# Patient Record
Sex: FEMALE | Race: BLACK OR AFRICAN AMERICAN | NOT HISPANIC OR LATINO | Employment: FULL TIME | URBAN - METROPOLITAN AREA
[De-identification: names, ages, dates, MRNs, and addresses within clinical notes are randomized per-mention and may not be internally consistent; named-entity substitution may affect disease eponyms.]

---

## 2017-07-09 PROCEDURE — 99284 EMERGENCY DEPT VISIT MOD MDM: CPT | Mod: EDC

## 2017-07-10 ENCOUNTER — HOSPITAL ENCOUNTER (EMERGENCY)
Facility: MEDICAL CENTER | Age: 22
End: 2017-07-10
Attending: EMERGENCY MEDICINE
Payer: MEDICAID

## 2017-07-10 ENCOUNTER — PATIENT OUTREACH (OUTPATIENT)
Dept: HEALTH INFORMATION MANAGEMENT | Facility: OTHER | Age: 22
End: 2017-07-10

## 2017-07-10 VITALS
RESPIRATION RATE: 18 BRPM | SYSTOLIC BLOOD PRESSURE: 127 MMHG | DIASTOLIC BLOOD PRESSURE: 85 MMHG | TEMPERATURE: 98.5 F | HEIGHT: 67 IN | HEART RATE: 60 BPM | OXYGEN SATURATION: 98 %

## 2017-07-10 DIAGNOSIS — K04.7 PERIAPICAL ABSCESS: ICD-10-CM

## 2017-07-10 PROCEDURE — 700101 HCHG RX REV CODE 250: Mod: EDC | Performed by: EMERGENCY MEDICINE

## 2017-07-10 PROCEDURE — A9270 NON-COVERED ITEM OR SERVICE: HCPCS | Mod: EDC | Performed by: EMERGENCY MEDICINE

## 2017-07-10 PROCEDURE — 700102 HCHG RX REV CODE 250 W/ 637 OVERRIDE(OP): Mod: EDC | Performed by: EMERGENCY MEDICINE

## 2017-07-10 RX ORDER — ACETAMINOPHEN 325 MG/1
1000 TABLET ORAL ONCE
Status: COMPLETED | OUTPATIENT
Start: 2017-07-10 | End: 2017-07-10

## 2017-07-10 RX ORDER — IBUPROFEN 600 MG/1
600 TABLET ORAL ONCE
Status: COMPLETED | OUTPATIENT
Start: 2017-07-10 | End: 2017-07-10

## 2017-07-10 RX ORDER — BUPIVACAINE HYDROCHLORIDE 5 MG/ML
30 INJECTION, SOLUTION EPIDURAL; INTRACAUDAL ONCE
Status: COMPLETED | OUTPATIENT
Start: 2017-07-10 | End: 2017-07-10

## 2017-07-10 RX ORDER — IBUPROFEN 200 MG
200 TABLET ORAL EVERY 6 HOURS PRN
COMMUNITY
End: 2019-04-18

## 2017-07-10 RX ORDER — AMOXICILLIN 875 MG/1
875 TABLET, COATED ORAL 2 TIMES DAILY
Qty: 20 TAB | Refills: 0 | Status: SHIPPED | OUTPATIENT
Start: 2017-07-10 | End: 2017-07-20

## 2017-07-10 RX ORDER — ACETAMINOPHEN 325 MG/1
650 TABLET ORAL EVERY 4 HOURS PRN
COMMUNITY
End: 2020-02-29

## 2017-07-10 RX ORDER — AMOXICILLIN 500 MG/1
500 CAPSULE ORAL ONCE
Status: COMPLETED | OUTPATIENT
Start: 2017-07-10 | End: 2017-07-10

## 2017-07-10 RX ADMIN — AMOXICILLIN 500 MG: 500 CAPSULE ORAL at 01:52

## 2017-07-10 RX ADMIN — IBUPROFEN 600 MG: 600 TABLET, FILM COATED ORAL at 01:52

## 2017-07-10 RX ADMIN — BUPIVACAINE HYDROCHLORIDE 30 ML: 5 INJECTION, SOLUTION EPIDURAL; INTRACAUDAL; PERINEURAL at 01:00

## 2017-07-10 RX ADMIN — ACETAMINOPHEN 975 MG: 325 TABLET, FILM COATED ORAL at 01:52

## 2017-07-10 NOTE — ED NOTES
"Jose Antonio Wiggins D/C'd.  Discharge instructions including s/s to return to ED, follow up appointments, hydration importance and pain managment  provided to pt.    Pt verbalized understanding with no further questions and concerns.    Copy of discharge provided to pt.  Signed copy in chart.    Prescription for amoxil e-prescript provided to pt.   Pt ambulates out of department; pt in NAD, awake, alert, interactive and age appropriate.  VS /85 mmHg  Pulse 60  Temp(Src) 36.9 °C (98.5 °F)  Resp 18  Ht 1.702 m (5' 7\")  SpO2 98%    "

## 2017-07-10 NOTE — DISCHARGE INSTRUCTIONS
Dental Abscess  A dental abscess is pus in or around a tooth.  HOME CARE  · Take medicines only as told by your dentist.  · If you were prescribed antibiotic medicine, finish all of it even if you start to feel better.  · Rinse your mouth (gargle) often with salt water.  · Do not drive or use heavy machinery, like a , while taking pain medicine.  · Do not apply heat to the outside of your mouth.  · Keep all follow-up visits as told by your dentist. This is important.  GET HELP IF:  · Your pain is worse, and medicine does not help.  GET HELP RIGHT AWAY IF:  · You have a fever or chills.  · Your symptoms suddenly get worse.  · You have a very bad headache.  · You have problems breathing or swallowing.  · You have trouble opening your mouth.  · You have puffiness (swelling) in your neck or around your eye.     This information is not intended to replace advice given to you by your health care provider. Make sure you discuss any questions you have with your health care provider.     Document Released: 05/03/2016 Document Reviewed: 12/15/2015  [employer] Interactive Patient Education ©2016 [employer]

## 2017-07-10 NOTE — ED AVS SNAPSHOT
7/10/2017    Jose Antonio Tj Wiggins  [address]  [place] [address]    Dear [facility]:    [facility] wants to ensure your discharge home is safe and you or your loved ones have had all of your questions answered regarding your care after you leave the hospital.    Below is a list of resources and contact information should you have any questions regarding your hospital stay, follow-up instructions, or active medical symptoms.    Questions or Concerns Regarding… Contact   Medical Questions Related to Your Discharge  (7 days a week, 8am-5pm) Contact a Nurse Care Coordinator   [phone]   Medical Questions Not Related to Your Discharge  (24 hours a day / 7 days a week)  Contact the Nurse Health Line   [phone]    Medications or Discharge Instructions Refer to your discharge packet   or contact your [facility] Primary Care Provider   [phone]   Follow-up Appointment(s) Schedule your appointment via TripConnect   or contact Scheduling [phone]   Billing Review your statement via TripConnect  or contact Billing [phone]   Medical Records Review your records via TripConnect   or contact Medical Records [phone]     You may receive a telephone call within two days of discharge. This call is to make certain you understand your discharge instructions and have the opportunity to have any questions answered. You can also easily access your medical information, test results and upcoming appointments via the TripConnect free online health management tool. You can learn more and sign up at [email or web address]. For assistance setting up your TripConnect account, please call [phone].    Once again, we want to ensure your discharge home is safe and that you have a clear understanding of any next steps in your care. If you have any questions or concerns, please do not hesitate to contact us, we are here for you. Thank you for choosing [facility] for your healthcare needs.    Sincerely,    Your [facility] Healthcare Team

## 2017-07-10 NOTE — ED NOTES
.  Chief Complaint   Patient presents with   • Dental Pain     pt with co pain to rt lower tooth for few days now has decay noted to tooth, states that pain radiating to rt ear now     State took tylenol and ibuprofen both today without any relief.Pt Informed regarding triage process and verbalized understanding to inform triage tech or RN for any changes in condition.  Placed in lobby.

## 2017-07-10 NOTE — ED AVS SNAPSHOT
RatePoint Access Code: [email or web address]  Expires: 8/9/2017  1:39 AM    RatePoint  A secure, online tool to manage your health information     [employer]’s RatePoint® is a secure, online tool that connects you to your personalized health information from the privacy of your home -- day or night - making it very easy for you to manage your healthcare. Once the activation process is completed, you can even access your medical information using the RatePoint shabana, which is available for free in the Apple Shabana store or Google Play store.     RatePoint provides the following levels of access (as shown below):   My Chart Features   [facility] Primary Care Doctor [facility]  Specialists [facility]  Urgent  Care Non-[facility]  Primary Care  Doctor   Email your healthcare team securely and privately 24/7 X X X X   Manage appointments: schedule your next appointment; view details of past/upcoming appointments X      Request prescription refills. X      View recent personal medical records, including lab and immunizations X X X X   View health record, including health history, allergies, medications X X X X   Read reports about your outpatient visits, procedures, consult and ER notes X X X X   See your discharge summary, which is a recap of your hospital and/or ER visit that includes your diagnosis, lab results, and care plan. X X       How to register for RatePoint:  1. Go to  [email or web address].  2. Click on the Sign Up Now box, which takes you to the New Member Sign Up page. You will need to provide the following information:  a. Enter your RatePoint Access Code exactly as it appears at the top of this page. (You will not need to use this code after you’ve completed the sign-up process. If you do not sign up before the expiration date, you must request a new code.)   b. Enter your date of birth.   c. Enter your home email address.   d. Click Submit, and follow the next screen’s instructions.  3. Create a RatePoint ID. This will be your RatePoint  login ID and cannot be changed, so think of one that is secure and easy to remember.  4. Create a GRAVIDI password. You can change your password at any time.  5. Enter your Password Reset Question and Answer. This can be used at a later time if you forget your password.   6. Enter your e-mail address. This allows you to receive e-mail notifications when new information is available in GRAVIDI.  7. Click Sign Up. You can now view your health information.    For assistance activating your GRAVIDI account, call [phone]

## 2017-07-10 NOTE — ED NOTES
Pt reports lower right sided dental pain that comes and goes, pt reports placing a white filling in the hole, no obvious swelling noted, pt denies fevers. Pt reports taking motrin and tylenol earlier today.

## 2017-07-10 NOTE — ED PROVIDER NOTES
"ED Provider Note    CHIEF COMPLAINT  Chief Complaint   Patient presents with   • Dental Pain     pt with co pain to rt lower tooth for few days now has decay noted to tooth, states that pain radiating to rt ear now       HPI  Jose Antonio Wiggins is a 22 y.o. female who presents here for evaluation of dental pain. The patient states that she has had dental problem with this tooth for a couple of months. The patient states that over the past 3 days it has become more painful, swollen, and radiating up the side of her face. The patient denies associated fevers, but does endorse sensitivities with hot and cold liquids. The patient states that she was having difficulty tolerating this, and due to that, decided to present here for further evaluation.    REVIEW OF SYSTEMS  See HPI for further details. All other systems are negative.     PAST MEDICAL HISTORY   has a past medical history of Aortic septal defect (Diagnosed 3/2007); Heart murmur; H/O heart surgery \"ASD\" Has implant card in media 2008 (6/20/2012); Proteinuria 2+ with voided specimen with first visit (6/20/2012); and Anemia (10/16/2012).    SOCIAL HISTORY  Social History     Social History Main Topics   • Smoking status: Current Some Day Smoker -- 0.25 packs/day for 2 years     Types: Cigarettes     Last Attempt to Quit: 10/01/2013   • Smokeless tobacco: Never Used      Comment: 1/2 pack a day. Pt. quit when she found out she was pregnanct.    • Alcohol Use: Yes      Comment: occassional   • Drug Use: No   • Sexual Activity:     Partners: Male      Comment: none       SURGICAL HISTORY   has past surgical history that includes other cardiac surgery.    CURRENT MEDICATIONS  Home Medications     **Home medications have not yet been reviewed for this encounter**          ALLERGIES  Allergies   Allergen Reactions   • Norco [Hydrocodone-Acetaminophen] Rash       PHYSICAL EXAM  VITAL SIGNS: /71 mmHg  Pulse 66  Temp(Src) 37.2 °C (98.9 °F)  Resp 14  Ht 1.702 " "m (5' 7\")  SpO2 98%   Pulse ox interpretation: I interpret this pulse ox as normal.  Constitutional: Alert in no apparent distress.  HENT: Normocephalic, Atraumatic, Bilateral external ears normal. Nose normal, tooth #1 with a small amount of dentin placed over the obvious area of cavity.   Eyes: Pupils are equal and reactive. Conjunctiva normal, non-icteric.   Heart: Regular rate and rythm.  Lungs: No audible wheezing, no increased work of breathing, no accessory muscle use.  Abdomen: Soft, non-distended, non-tender   Skin: Warm, Dry, No erythema, No rash.   Neurologic: Alert, Grossly non-focal.   Psychiatric: Affect normal, Judgment normal, Mood normal, appears alert and not intoxicated.           COURSE & MEDICAL DECISION MAKING  Pertinent Labs & Imaging studies reviewed. (See chart for details)    Patient presented here for evaluation of dental pain. Here on examination the patient has what appears to be a cavity at tooth #1. Given this, the patient likely has a periapical abscess, and though inferior alveolar  nerve block was considered for this patient, the patient did not feel that she needed it. Given this, the patient was given ibuprofen and Tylenol here, and was discharged with a prescription for amoxicillin and treatment for her dental infection. The patient was instructed to return here should she develop any new or concerning symptoms.    The patient will not drink alcohol nor drive with prescribed medications. The patient will return for worsening symptoms and is stable at the time of discharge. The patient verbalizes understanding and will comply.    The patient is referred to a primary physician for blood pressure management, diabetic screening, and for all other preventative health concerns, should they be present.    FINAL IMPRESSION  1. Tooth #1 periapical abscess  2.   3.         Electronically signed by: Tyson Schumacher, 7/10/2017 12:39 AM      This record was made with a voice recognition " software. I have tried to correct any grammar, spelling or context errors to the best of my ability, but errors may still remain. Interpretation of this chart should be taken in this context.

## 2017-07-10 NOTE — ED AVS SNAPSHOT
Home Care Instructions                                                                                                                Jose Antonio Wiggins   MRN: [id number]    Department:  [facility], Emergency Dept   Date of Visit:  7/9/2017            [facility], Emergency Dept    [address] [address]    Phone:  [phone]      You were seen by     Tyson Schumacher M.D.      Your Diagnosis Was     Periapical abscess     K04.7       These are the medications you received during your hospitalization from 07/09/2017 2209 to 07/10/2017 0139     Date/Time Order Dose Route Action    07/10/2017 0100 bupivacaine (pf) (MARCAINE/SENSORCAINE) 0.5 % injection 30 mL 30 mL Injection Given      Follow-up Information     1. Schedule an appointment as soon as possible for a visit with [facility].    Contact information    [address] [place] [address] [phone]      Medication Information     Review all of your home medications and newly ordered medications with your primary doctor and/or pharmacist as soon as possible. Follow medication instructions as directed by your doctor and/or pharmacist.     Please keep your complete medication list with you and share with your physician. Update the information when medications are discontinued, doses are changed, or new medications (including over-the-counter products) are added; and carry medication information at all times in the event of emergency situations.               Medication List      START taking these medications        Instructions    Morning Afternoon Evening Bedtime    amoxicillin 875 MG tablet   Commonly known as:  AMOXIL        Take 1 Tab by mouth 2 times a day for 10 days.   Dose:  875 mg                          ASK your doctor about these medications        Instructions    Morning Afternoon Evening Bedtime    acetaminophen 325 MG Tabs   Commonly known as:  TYLENOL        Take 650 mg  by mouth every four hours as needed.   Dose:  650 mg                        ibuprofen 200 MG Tabs   Commonly known as:  MOTRIN        Take 200 mg by mouth every 6 hours as needed.   Dose:  200 mg                             Where to Get Your Medications      These medications were sent to [id number] - [place] - [address]  [address] [place] [address]     Phone:  [phone]    - amoxicillin 875 MG tablet              Discharge Instructions       Dental Abscess  A dental abscess is pus in or around a tooth.  HOME CARE  · Take medicines only as told by your dentist.  · If you were prescribed antibiotic medicine, finish all of it even if you start to feel better.  · Rinse your mouth (gargle) often with salt water.  · Do not drive or use heavy machinery, like a , while taking pain medicine.  · Do not apply heat to the outside of your mouth.  · Keep all follow-up visits as told by your dentist. This is important.  GET HELP IF:  · Your pain is worse, and medicine does not help.  GET HELP RIGHT AWAY IF:  · You have a fever or chills.  · Your symptoms suddenly get worse.  · You have a very bad headache.  · You have problems breathing or swallowing.  · You have trouble opening your mouth.  · You have puffiness (swelling) in your neck or around your eye.     This information is not intended to replace advice given to you by your health care provider. Make sure you discuss any questions you have with your health care provider.     Document Released: 05/03/2016 Document Reviewed: 12/15/2015  Elsevier Interactive Patient Education ©2016 Elsevier Inc.            Patient Information     Patient Information    Following emergency treatment: all patient requiring follow-up care must return either to a private physician or a clinic if your condition worsens before you are able to obtain further medical attention, please return to the emergency room.     Billing Information    At [employer], we  work to make the billing process streamlined for our patients.  Our Representatives are here to answer any questions you may have regarding your hospital bill.  If you have insurance coverage and have supplied your insurance information to us, we will submit a claim to your insurer on your behalf.  Should you have any questions regarding your bill, we can be reached online or by phone as follows:  Online: You are able pay your bills online or live chat with our representatives about any billing questions you may have. We are here to help Monday - Friday from 8:00am to 7:30pm and 9:00am - 12:00pm on Saturdays.  Please visit [email or web address]  for more information.   Phone:  [phone] or [phone]    Please note that your emergency physician, surgeon, pathologist, radiologist, anesthesiologist, and other specialists are not employed by [facility] and will therefore bill separately for their services.  Please contact them directly for any questions concerning their bills at the numbers below:     Emer[facility] Physician Services:  [phone]  [facility]:  [phone]  [facility]:  [phone]  [facility]:  [phone]    1. Your final bill may vary from the amount quoted upon discharge if all procedures are not complete at that time, or if your doctor has additional procedures of which we are not aware. You will receive an additional bill if you return to the Emergency Department at [facility] for suture removal regardless of the facility of which the sutures were placed.     2. Please arrange for settlement of this account at the emergency registration.    3. All self-pay accounts are due in full at the time of treatment.  If you are unable to meet this obligation then payment is expected within 4-5 days.     4. If you have had radiology studies (CT, X-ray, Ultrasound, MRI), you have received a preliminary result during  your emergency department visit. Please contact the radiology department [phone] to receive a copy of your final result. Please discuss the Final result with your primary physician or with the follow up physician provided.     Crisis Hotline:  [place] Crisis Hotline:  0-242-NHUHVSJ or 1-248.504.3643  [place] Crisis Hotline:    1-626.699.8895 or 658-820-7172         ED Discharge Follow Up Questions    1. In order to provide you with very good care, we would like to follow up with a phone call in the next few days.  May we have your permission to contact you?     YES /  NO    2. What is the best phone number to call you? (       )_____-__________    3. What is the best time to call you?      Morning  /  Afternoon  /  Evening                   Patient Signature:  ____________________________________________________________    Date:  ____________________________________________________________

## 2018-04-29 ENCOUNTER — HOSPITAL ENCOUNTER (EMERGENCY)
Facility: MEDICAL CENTER | Age: 23
End: 2018-04-29
Attending: EMERGENCY MEDICINE
Payer: MEDICAID

## 2018-04-29 VITALS
TEMPERATURE: 98.7 F | BODY MASS INDEX: 23.7 KG/M2 | WEIGHT: 151.01 LBS | HEART RATE: 83 BPM | RESPIRATION RATE: 16 BRPM | SYSTOLIC BLOOD PRESSURE: 128 MMHG | DIASTOLIC BLOOD PRESSURE: 77 MMHG | OXYGEN SATURATION: 99 % | HEIGHT: 67 IN

## 2018-04-29 DIAGNOSIS — J02.9 PHARYNGITIS, UNSPECIFIED ETIOLOGY: ICD-10-CM

## 2018-04-29 PROCEDURE — 99283 EMERGENCY DEPT VISIT LOW MDM: CPT

## 2018-04-29 RX ORDER — AMOXICILLIN 500 MG/1
500 CAPSULE ORAL 3 TIMES DAILY
Qty: 30 CAP | Refills: 0 | Status: SHIPPED | OUTPATIENT
Start: 2018-04-29 | End: 2018-08-11

## 2018-04-29 ASSESSMENT — PAIN SCALES - GENERAL: PAINLEVEL_OUTOF10: 6

## 2018-04-29 NOTE — DISCHARGE INSTRUCTIONS
Use Tylenol and Motrin if needed for fever or discomfort. Drink lots of fluids to maintain hydration. Stop smoking. Return here if you feel you're having new or worsening symptoms.

## 2018-04-29 NOTE — ED PROVIDER NOTES
"ED Provider Note    CHIEF COMPLAINT  Chief Complaint   Patient presents with   • Sore Throat   • Sinus Pain       HPI  Jose Antonio Wiggins is a 23 y.o. female who presents to the emergency department complaining of sore throat right side sinus pain and right ear discomfort. Symptoms have been worsening over the last 2 days. There's been no fever she does have a cough.    REVIEW OF SYSTEMS no shortness of breath no hemoptysis no vomiting.    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 Pt. quit when she found out she was pregnanct.    • Alcohol use Yes      Comment: occassional   • Drug use: No   • Sexual activity: Yes     Partners: Male      Comment: none     Other Topics Concern   • Not on file     Social History Narrative   • No narrative on file   Patient says she has started smoking again     SURGICAL HISTORY  Past Surgical History:   Procedure Laterality Date   • OTHER CARDIAC SURGERY      as a child       CURRENT MEDICATIONS  Home Medications    **Home medications have not yet been reviewed for this encounter**         ALLERGIES  Allergies   Allergen Reactions   • Norco [Hydrocodone-Acetaminophen] Rash       PHYSICAL EXAM  VITAL SIGNS: /77   Pulse 83   Temp 37.1 °C (98.7 " "°F)   Resp 16   Ht 1.702 m (5' 7\")   Wt 68.5 kg (151 lb 0.2 oz)   SpO2 99%   BMI 23.65 kg/m²    Oxygen saturation is interpreted as adequate  Constitutional: Awake and generally well-appearing individual in no distress  HENT: The throat is erythematous I don't see any pus or discharge or sign of abscess the patient has a slightly hoarse voice is no facial erythema or swelling, the right tympanic membrane looks normal  Eyes: No erythema or discharge  Neck: No lymphadenopathy or meningeal findings  Cardiovascular: Regular rate and rhythm  Lungs: Clear and equal bilaterally with no apparent difficulty breathing  Skin: Warm and dry  Musculoskeletal: No acute bony deformity  Neurologic: Awake verbal moving all extremities    MEDICAL DECISION MAKING and DISPOSITION  The patient is generally well-appearing, I have offered rapid strep testing but she would prefer empiric antibiotic treatment and therefore written her a prescription for amoxicillin. I have recommended Tylenol and Motrin if needed for discomfort and lots of fluids to maintain hydration and if the patient feels she is having new or worsening symptoms she is to return her for recheck and I have encouraged her to stop smoking    IMPRESSION  1. Pharyngitis         Electronically signed by: Ezra Martin, 4/29/2018 1:33 PM      "

## 2018-04-29 NOTE — ED TRIAGE NOTES
Chief Complaint   Patient presents with   • Sore Throat   • Sinus Pain     Symptoms X 3 days.  No OTC medications pta.  Triage process explained to patient.  Pt back to waiting room.  Pt instructed to inform RN if any changes or questions arise.

## 2018-08-11 ENCOUNTER — HOSPITAL ENCOUNTER (EMERGENCY)
Facility: MEDICAL CENTER | Age: 23
End: 2018-08-11
Attending: EMERGENCY MEDICINE
Payer: MEDICAID

## 2018-08-11 VITALS
TEMPERATURE: 98.8 F | SYSTOLIC BLOOD PRESSURE: 99 MMHG | HEIGHT: 68 IN | OXYGEN SATURATION: 97 % | DIASTOLIC BLOOD PRESSURE: 75 MMHG | BODY MASS INDEX: 23.52 KG/M2 | RESPIRATION RATE: 16 BRPM | HEART RATE: 70 BPM | WEIGHT: 155.2 LBS

## 2018-08-11 DIAGNOSIS — J02.0 STREP PHARYNGITIS: ICD-10-CM

## 2018-08-11 LAB
S PYO AG THROAT QL: ABNORMAL
SIGNIFICANT IND 70042: ABNORMAL
SITE SITE: ABNORMAL
SOURCE SOURCE: ABNORMAL

## 2018-08-11 PROCEDURE — 700102 HCHG RX REV CODE 250 W/ 637 OVERRIDE(OP): Mod: EDC | Performed by: EMERGENCY MEDICINE

## 2018-08-11 PROCEDURE — A9270 NON-COVERED ITEM OR SERVICE: HCPCS | Mod: EDC | Performed by: EMERGENCY MEDICINE

## 2018-08-11 PROCEDURE — 700111 HCHG RX REV CODE 636 W/ 250 OVERRIDE (IP): Mod: EDC | Performed by: EMERGENCY MEDICINE

## 2018-08-11 PROCEDURE — 87880 STREP A ASSAY W/OPTIC: CPT | Mod: EDC

## 2018-08-11 PROCEDURE — 99284 EMERGENCY DEPT VISIT MOD MDM: CPT | Mod: EDC

## 2018-08-11 RX ORDER — DEXAMETHASONE SODIUM PHOSPHATE 10 MG/ML
10 INJECTION, SOLUTION INTRAMUSCULAR; INTRAVENOUS ONCE
Status: COMPLETED | OUTPATIENT
Start: 2018-08-11 | End: 2018-08-11

## 2018-08-11 RX ORDER — IBUPROFEN 600 MG/1
600 TABLET ORAL ONCE
Status: COMPLETED | OUTPATIENT
Start: 2018-08-11 | End: 2018-08-11

## 2018-08-11 RX ORDER — AMOXICILLIN 500 MG/1
500 CAPSULE ORAL 2 TIMES DAILY
Qty: 20 CAP | Refills: 0 | Status: SHIPPED | OUTPATIENT
Start: 2018-08-11 | End: 2018-08-21

## 2018-08-11 RX ADMIN — IBUPROFEN 600 MG: 600 TABLET, FILM COATED ORAL at 04:58

## 2018-08-11 RX ADMIN — DEXAMETHASONE SODIUM PHOSPHATE 10 MG: 10 INJECTION, SOLUTION INTRAMUSCULAR; INTRAVENOUS at 04:57

## 2018-08-11 ASSESSMENT — PAIN SCALES - GENERAL: PAINLEVEL_OUTOF10: 4

## 2018-08-11 NOTE — DISCHARGE INSTRUCTIONS
You were seen in the Emergency Department for sore throat.    Strep test was positive.    Please use 1,000mg of tylenol or 600mg of ibuprofen every 6 hours as needed for pain.  Take antibiotics as directed.    Please follow up with your primary care physician.    Return to the Emergency Department with persistent fevers greater than 100.4, oral swelling, trouble breathing, or other concerns.      Strep Throat  Strep throat is an infection of the throat. It is caused by germs. Strep throat spreads from person to person because of coughing, sneezing, or close contact.  Follow these instructions at home:  Medicines  · Take over-the-counter and prescription medicines only as told by your doctor.  · Take your antibiotic medicine as told by your doctor. Do not stop taking the medicine even if you feel better.  · Have family members who also have a sore throat or fever go to a doctor.  Eating and drinking  · Do not share food, drinking cups, or personal items.  · Try eating soft foods until your sore throat feels better.  · Drink enough fluid to keep your pee (urine) clear or pale yellow.  General instructions  · Rinse your mouth (gargle) with a salt-water mixture 3-4 times per day or as needed. To make a salt-water mixture, stir ½-1 tsp of salt into 1 cup of warm water.  · Make sure that all people in your house wash their hands well.  · Rest.  · Stay home from school or work until you have been taking antibiotics for 24 hours.  · Keep all follow-up visits as told by your doctor. This is important.  Contact a doctor if:  · Your neck keeps getting bigger.  · You get a rash, cough, or earache.  · You cough up thick liquid that is green, yellow-brown, or bloody.  · You have pain that does not get better with medicine.  · Your problems get worse instead of getting better.  · You have a fever.  Get help right away if:  · You throw up (vomit).  · You get a very bad headache.  · You neck hurts or it feels stiff.  · You have  chest pain or you are short of breath.  · You have drooling, very bad throat pain, or changes in your voice.  · Your neck is swollen or the skin gets red and tender.  · Your mouth is dry or you are peeing less than normal.  · You keep feeling more tired or it is hard to wake up.  · Your joints are red or they hurt.  This information is not intended to replace advice given to you by your health care provider. Make sure you discuss any questions you have with your health care provider.  Document Released: 06/05/2009 Document Revised: 08/16/2017 Document Reviewed: 04/11/2016  [employer] Interactive Patient Education © 2017 [employer]

## 2018-08-11 NOTE — ED PROVIDER NOTES
"ER Provider Note     Scribed for Yana Dinh M.D. by Ramsey Merchant. 8/11/2018, 4:35 AM.    Primary Care Provider: Pcp Pt States None  Means of Arrival: Walk In   History obtained from: Parent  History limited by: None     CHIEF COMPLAINT   Chief Complaint   Patient presents with   • Sore Throat     sore throat x1 week, fever earlier in the week that has since resolved, reports seeing white patches on tonsils, muffled voice noted       HPI   Jose Antonio Wiggins is a 23 y.o. Otherwise healthy female who was brought into the ED for evaluation of a sore throat onset 5 days prior. Patient reports an associated fever earlier this week as well, which was measured at 101 °F in addition to mild cough and nasal congestion. She has noticed white spots to the back of her throat.  Patient presents with son who has similar complaints.       REVIEW OF SYSTEMS   Pertinent positives include sore throat, fever.   Pertinent negatives include no nausea, vomiting.  E.     PAST MEDICAL HISTORY   has a past medical history of Anemia (10/16/2012); Aortic septal defect (Diagnosed 3/2007); H/O heart surgery \"ASD\" Has implant card in media 2008 (6/20/2012); Heart murmur; and Proteinuria 2+ with voided specimen with first visit (6/20/2012).  Vaccinations are up to date.    SOCIAL HISTORY  Social History     Social History Main Topics   • Smoking status: Former Smoker     Packs/day: 0.25     Years: 2.00     Types: Cigarettes     Quit date: 10/1/2013   • Smokeless tobacco: Never Used      Comment: 1/2 pack a day. Pt. quit when she found out she was pregnanct.    • Alcohol use Yes      Comment: occassional   • Drug use: No   • Sexual activity: Yes     Partners: Male      Comment: none     Patients son accompanied by mother    SURGICAL HISTORY   has a past surgical history that includes other cardiac surgery.    CURRENT MEDICATIONS  Home Medications     Reviewed by Maureen Gallagher R.N. (Registered Nurse) on 08/11/18 at 0411  Med List " "Status: Partial   Medication Last Dose Status   acetaminophen (TYLENOL) 325 MG Tab 7/10/2017 Active   amoxicillin (AMOXIL) 500 MG Cap  Active   ibuprofen (MOTRIN) 200 MG Tab 7/10/2017 Active                ALLERGIES  Allergies   Allergen Reactions   • Norco [Hydrocodone-Acetaminophen] Rash       PHYSICAL EXAM - Mother   Vital Signs: /69   Pulse 70   Temp 36.8 °C (98.2 °F)   Resp 18   Ht 1.727 m (5' 8\")   Wt 70.4 kg (155 lb 3.3 oz)   SpO2 96%   BMI 23.60 kg/m²     Constitutional: Well developed, Well nourished. No acute distress. Nontoxic appearing.  HENT: Posterior oropharynx with erythema and scattered exudates Normocephalic, Atraumatic. Bilateral external ears normal, Nose normal. Moist mucus membranes.   Neck:  Cervical lymphadenopathy, full range of motion  Eyes: Pupils equal and reactive bilaterally. Conjunctiva normal.  Cardiovascular: Regular rate and rhythm. No murmurs.  Thorax & Lungs: No respiratory distress with normal work of breathing.  Lungs clear to auscultation bilaterally. No wheezing or stridor.   Skin: Warm, Dry. No erythema, No rash. Normal peripheral perfusion.  Abdomen: Soft, no distention. No tenderness to palpation. No masses.  Musculoskeletal: Atraumatic. No deformities noted.  Neurologic: Alert & interactive. Moving all extremities spontaneously without focal deficits.      DIAGNOSTIC STUDIES    LABS  Personally reviewed by me  Labs Reviewed   RAPID STREP,CULT IF INDICATED - Abnormal; Notable for the following:        Result Value    Significant Indicator POS (*)     Rapid Strep Screen Positive for Group A streptococcus. (*)     All other components within normal limits    Narrative:     CALL  [place]  ER tel. ,  CALLED  ER tel.  08/11/2018, 04:50, RB PERF. RESULTS CALLED TO: RN [id number]       ED COURSE  Vitals:    08/11/18 0406 08/11/18 0529   BP: 116/69 (!) 99/75   Pulse: 70 70   Resp: 18 16   Temp: 36.8 °C (98.2 °F) 37.1 °C (98.8 °F)   SpO2: 96% 97%   Weight: 70.4 kg (155 lb 3.3 " "oz)    Height: 1.727 m (5' 8\")          Medications administered:  Medications   dexamethasone pf (DECADRON) injection 10 mg (10 mg Oral Given 8/11/18 0457)   ibuprofen (MOTRIN) tablet 600 mg (600 mg Oral Given 8/11/18 0458)         4:35 AM Patient seen and examined at bedside. The patient presents with sore throat. Ordered for Rapid strep to evaluate. Patient will be treated with Amoxil 500 mg for her symptoms.       MEDICAL DECISION MAKING  Patient with 5 day history of sore throat and fever.  Vitals reassuring on arrival.  Exam not concerning for meningitis, pneumonia, peritonsillar abscess.  Strep test positive.  Patient will be treated with steroids and antibiotics.  Patient understands plan of care and strict return precautions for changing or worsening symptoms.      DISPOSITION:  Patient will be discharged home in stable condition.    FOLLOW UP:  [facility], Emergency Dept  [address] [facility] [address] [phone]    If symptoms worsen      OUTPATIENT MEDICATIONS:  Discharge Medication List as of 8/11/2018  5:16 AM      START taking these medications    Details   amoxicillin (AMOXIL) 500 MG Cap Take 1 Cap by mouth 2 times a day for 10 days., Disp-20 Cap, R-0, Print Rx Paper             Guardian was given return precautions and verbalizes understanding. They will return to the ED with new or worsening symptoms.     FINAL IMPRESSION   1. Strep pharyngitis         Ramsey LAY (Fadumo), am scribing for, and in the presence of, Yana Dinh M.D..    Electronically signed by: Ramsey Merchant (Fadumo), 8/11/2018    Yana LAY M.D. personally performed the services described in this documentation, as scribed by Ramsey Merchant in my presence, and it is both accurate and complete.    The note accurately reflects work and decisions made by me.  Yana Dinh  8/11/2018  7:14 PM    "

## 2018-08-11 NOTE — ED TRIAGE NOTES
"Jose Antonio Wiggins  23 y.o.    Came in for   Chief Complaint   Patient presents with   • Sore Throat     sore throat x1 week, fever earlier in the week that has since resolved, reports seeing white patches on tonsils, muffled voice noted     /69   Pulse 70   Temp 36.8 °C (98.2 °F)   Resp 18   Ht 1.727 m (5' 8\")   Wt 70.4 kg (155 lb 3.3 oz)   SpO2 96%   BMI 23.60 kg/m²     Pt A&O x4. Muffled voice noted when speaking. Strep swab collected and sent to lab. Taken back to peds 53 with son at this time. Aware to remain NPO until seen by ERP.  "

## 2018-08-11 NOTE — ED NOTES
Jose Antonio Wiggins D/C'd.  Discharge instructions including the importance of hydration, the use of OTC medications, information on strep throat and the proper follow up recommendations have been provided to the pt.  Pt states understanding.  Pt states all questions have been answered.  A copy of the discharge instructions have been provided to pt.  A signed copy is in the chart.  Prescription for amoxicliin provided to pt.   Pt walked out of department; pt in NAD, awake, alert, interactive and age appropriate

## 2018-08-11 NOTE — ED NOTES
Blake from Lab called with critical result of group A strep positive at 0452. Critical lab result read back to Blake.   Dr. Austin notified of critical lab result at 0452.  Critical lab result read back by Dr. Austin.

## 2018-08-11 NOTE — ED NOTES
Pt to Peds 53 with son. Triage note reviewed and agreed.  Pt reports pain to throat but denies need for pain meds. Gown and blankets provided. Call light introduced.

## 2018-12-06 ENCOUNTER — HOSPITAL ENCOUNTER (EMERGENCY)
Facility: MEDICAL CENTER | Age: 23
End: 2018-12-06
Attending: EMERGENCY MEDICINE
Payer: MEDICAID

## 2018-12-06 ENCOUNTER — APPOINTMENT (OUTPATIENT)
Dept: RADIOLOGY | Facility: MEDICAL CENTER | Age: 23
End: 2018-12-06
Attending: EMERGENCY MEDICINE
Payer: MEDICAID

## 2018-12-06 ENCOUNTER — APPOINTMENT (OUTPATIENT)
Dept: RADIOLOGY | Facility: MEDICAL CENTER | Age: 23
End: 2018-12-06
Payer: MEDICAID

## 2018-12-06 VITALS
OXYGEN SATURATION: 99 % | SYSTOLIC BLOOD PRESSURE: 121 MMHG | RESPIRATION RATE: 14 BRPM | HEIGHT: 67 IN | TEMPERATURE: 98.1 F | BODY MASS INDEX: 24.81 KG/M2 | DIASTOLIC BLOOD PRESSURE: 67 MMHG | WEIGHT: 158.07 LBS | HEART RATE: 84 BPM

## 2018-12-06 DIAGNOSIS — F41.0 PANIC ATTACK: ICD-10-CM

## 2018-12-06 DIAGNOSIS — R00.2 PALPITATIONS: ICD-10-CM

## 2018-12-06 LAB
ALBUMIN SERPL BCP-MCNC: 4.9 G/DL (ref 3.2–4.9)
ALBUMIN/GLOB SERPL: 1.3 G/DL
ALP SERPL-CCNC: 83 U/L (ref 30–99)
ALT SERPL-CCNC: 15 U/L (ref 2–50)
AMPHET UR QL SCN: NEGATIVE
ANION GAP SERPL CALC-SCNC: 10 MMOL/L (ref 0–11.9)
APTT PPP: 28 SEC (ref 24.7–36)
AST SERPL-CCNC: 16 U/L (ref 12–45)
BARBITURATES UR QL SCN: NEGATIVE
BASOPHILS # BLD AUTO: 0.8 % (ref 0–1.8)
BASOPHILS # BLD: 0.06 K/UL (ref 0–0.12)
BENZODIAZ UR QL SCN: NEGATIVE
BILIRUB SERPL-MCNC: 0.2 MG/DL (ref 0.1–1.5)
BNP SERPL-MCNC: 12 PG/ML (ref 0–100)
BUN SERPL-MCNC: 8 MG/DL (ref 8–22)
BZE UR QL SCN: NEGATIVE
CALCIUM SERPL-MCNC: 10.2 MG/DL (ref 8.5–10.5)
CANNABINOIDS UR QL SCN: NEGATIVE
CHLORIDE SERPL-SCNC: 107 MMOL/L (ref 96–112)
CO2 SERPL-SCNC: 23 MMOL/L (ref 20–33)
CREAT SERPL-MCNC: 0.81 MG/DL (ref 0.5–1.4)
EKG IMPRESSION: NORMAL
EOSINOPHIL # BLD AUTO: 0.26 K/UL (ref 0–0.51)
EOSINOPHIL NFR BLD: 3.3 % (ref 0–6.9)
ERYTHROCYTE [DISTWIDTH] IN BLOOD BY AUTOMATED COUNT: 37.6 FL (ref 35.9–50)
GLOBULIN SER CALC-MCNC: 3.9 G/DL (ref 1.9–3.5)
GLUCOSE SERPL-MCNC: 97 MG/DL (ref 65–99)
HCT VFR BLD AUTO: 45.3 % (ref 37–47)
HGB BLD-MCNC: 15.1 G/DL (ref 12–16)
IMM GRANULOCYTES # BLD AUTO: 0.03 K/UL (ref 0–0.11)
IMM GRANULOCYTES NFR BLD AUTO: 0.4 % (ref 0–0.9)
INR PPP: 1.01 (ref 0.87–1.13)
LIPASE SERPL-CCNC: 76 U/L (ref 11–82)
LYMPHOCYTES # BLD AUTO: 3.72 K/UL (ref 1–4.8)
LYMPHOCYTES NFR BLD: 46.6 % (ref 22–41)
MAGNESIUM SERPL-MCNC: 2.3 MG/DL (ref 1.5–2.5)
MCH RBC QN AUTO: 27.1 PG (ref 27–33)
MCHC RBC AUTO-ENTMCNC: 33.3 G/DL (ref 33.6–35)
MCV RBC AUTO: 81.2 FL (ref 81.4–97.8)
METHADONE UR QL SCN: NEGATIVE
MONOCYTES # BLD AUTO: 0.46 K/UL (ref 0–0.85)
MONOCYTES NFR BLD AUTO: 5.8 % (ref 0–13.4)
NEUTROPHILS # BLD AUTO: 3.46 K/UL (ref 2–7.15)
NEUTROPHILS NFR BLD: 43.1 % (ref 44–72)
NRBC # BLD AUTO: 0 K/UL
NRBC BLD-RTO: 0 /100 WBC
OPIATES UR QL SCN: NEGATIVE
OXYCODONE UR QL SCN: NEGATIVE
PCP UR QL SCN: NEGATIVE
PHOSPHATE SERPL-MCNC: 3.4 MG/DL (ref 2.5–4.5)
PLATELET # BLD AUTO: 258 K/UL (ref 164–446)
PMV BLD AUTO: 8.9 FL (ref 9–12.9)
POTASSIUM SERPL-SCNC: 3.9 MMOL/L (ref 3.6–5.5)
PROPOXYPH UR QL SCN: NEGATIVE
PROT SERPL-MCNC: 8.8 G/DL (ref 6–8.2)
PROTHROMBIN TIME: 13.4 SEC (ref 12–14.6)
RBC # BLD AUTO: 5.58 M/UL (ref 4.2–5.4)
SODIUM SERPL-SCNC: 140 MMOL/L (ref 135–145)
TROPONIN I SERPL-MCNC: <0.01 NG/ML (ref 0–0.04)
TSH SERPL DL<=0.005 MIU/L-ACNC: 1.23 UIU/ML (ref 0.38–5.33)
WBC # BLD AUTO: 8 K/UL (ref 4.8–10.8)

## 2018-12-06 PROCEDURE — 83880 ASSAY OF NATRIURETIC PEPTIDE: CPT

## 2018-12-06 PROCEDURE — 83690 ASSAY OF LIPASE: CPT

## 2018-12-06 PROCEDURE — 84484 ASSAY OF TROPONIN QUANT: CPT

## 2018-12-06 PROCEDURE — 93005 ELECTROCARDIOGRAM TRACING: CPT | Performed by: EMERGENCY MEDICINE

## 2018-12-06 PROCEDURE — 83735 ASSAY OF MAGNESIUM: CPT

## 2018-12-06 PROCEDURE — 85730 THROMBOPLASTIN TIME PARTIAL: CPT

## 2018-12-06 PROCEDURE — 80053 COMPREHEN METABOLIC PANEL: CPT

## 2018-12-06 PROCEDURE — 84100 ASSAY OF PHOSPHORUS: CPT

## 2018-12-06 PROCEDURE — 85025 COMPLETE CBC W/AUTO DIFF WBC: CPT

## 2018-12-06 PROCEDURE — 93005 ELECTROCARDIOGRAM TRACING: CPT

## 2018-12-06 PROCEDURE — 99284 EMERGENCY DEPT VISIT MOD MDM: CPT

## 2018-12-06 PROCEDURE — 84443 ASSAY THYROID STIM HORMONE: CPT

## 2018-12-06 PROCEDURE — 71045 X-RAY EXAM CHEST 1 VIEW: CPT

## 2018-12-06 PROCEDURE — 85610 PROTHROMBIN TIME: CPT

## 2018-12-06 PROCEDURE — 36415 COLL VENOUS BLD VENIPUNCTURE: CPT

## 2018-12-06 PROCEDURE — 80307 DRUG TEST PRSMV CHEM ANLYZR: CPT

## 2018-12-06 ASSESSMENT — PAIN SCALES - GENERAL: PAINLEVEL_OUTOF10: 0

## 2018-12-07 ENCOUNTER — PATIENT OUTREACH (OUTPATIENT)
Dept: HEALTH INFORMATION MANAGEMENT | Facility: OTHER | Age: 23
End: 2018-12-07
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
## 2018-12-07 NOTE — DISCHARGE INSTRUCTIONS
Return to the ER for worsening symptoms    Establish care with a primary care provider.  We have left a message for the ER  who will call you with a primary care provider's name and contact information.

## 2018-12-07 NOTE — ED NOTES
Ambulatory to room, agree with triage note. States she feels improved at this time and no longer feeling palpitations. On monitor. VSS. Called lab for add on testing.

## 2018-12-07 NOTE — ED NOTES
Verbalizes understanding of discharge and followup instructions. Given community mental health resource list. VSS. Ambulatory with steady gait to discharge.
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
## 2019-02-26 ENCOUNTER — HOSPITAL ENCOUNTER (EMERGENCY)
Facility: MEDICAL CENTER | Age: 24
End: 2019-02-26
Attending: EMERGENCY MEDICINE
Payer: MEDICAID

## 2019-02-26 VITALS
DIASTOLIC BLOOD PRESSURE: 67 MMHG | HEART RATE: 70 BPM | RESPIRATION RATE: 16 BRPM | HEIGHT: 67 IN | OXYGEN SATURATION: 98 % | SYSTOLIC BLOOD PRESSURE: 106 MMHG | WEIGHT: 165.34 LBS | BODY MASS INDEX: 25.95 KG/M2 | TEMPERATURE: 98.1 F

## 2019-02-26 DIAGNOSIS — K04.7 DENTAL INFECTION: ICD-10-CM

## 2019-02-26 DIAGNOSIS — K08.89 PAIN, DENTAL: ICD-10-CM

## 2019-02-26 PROCEDURE — 99283 EMERGENCY DEPT VISIT LOW MDM: CPT

## 2019-02-26 PROCEDURE — 700102 HCHG RX REV CODE 250 W/ 637 OVERRIDE(OP): Performed by: EMERGENCY MEDICINE

## 2019-02-26 PROCEDURE — A9270 NON-COVERED ITEM OR SERVICE: HCPCS | Performed by: EMERGENCY MEDICINE

## 2019-02-26 RX ORDER — IBUPROFEN 600 MG/1
600 TABLET ORAL ONCE
Status: COMPLETED | OUTPATIENT
Start: 2019-02-26 | End: 2019-02-26

## 2019-02-26 RX ORDER — NAPROXEN 500 MG/1
500 TABLET ORAL 2 TIMES DAILY WITH MEALS
Qty: 60 TAB | Refills: 0 | Status: SHIPPED | OUTPATIENT
Start: 2019-02-26 | End: 2019-04-18

## 2019-02-26 RX ORDER — PENICILLIN V POTASSIUM 500 MG/1
500 TABLET ORAL 3 TIMES DAILY
Qty: 21 TAB | Refills: 0 | Status: SHIPPED | OUTPATIENT
Start: 2019-02-26 | End: 2019-03-05

## 2019-02-26 RX ADMIN — IBUPROFEN 600 MG: 600 TABLET ORAL at 15:27

## 2019-02-26 NOTE — ED PROVIDER NOTES
"ED Provider Note    Scribed for Tobi Naidu D.O. by Reese Browne. 2/26/2019  3:07 PM    Primary care provider: Pcp Pt States None  Means of arrival: Private vehicle  History obtained from: Patient  History limited by: None    CHIEF COMPLAINT  Chief Complaint   Patient presents with   • Tooth Ache       HPI  Jose Antonio Wiggins is a 23 y.o. female who presents to the Emergency Department complaining of gradually worsening left upper and right lower sided dental pain for the last week. Their pain radiates locally, is rated as moderate to severe and exacerbated with to chewing or biting. She reports associated left sided facial swelling. Patient is able to tolerate orals. Patient denies difficulty swallowing, difficulty breathing, fever, nausea, vomiting, chest pain, shortness of breath, weakness, numbness, bowel or bladder changes.     REVIEW OF SYSTEMS  Pertinent positives include dental pain, facial swelling. Pertinent negatives include no difficulty swallowing, difficulty breathing, fever, nausea, vomiting, chest pain, shortness of breath, weakness, numbness, bowel or bladder changes.      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 Pt. quit when she found out she was pregnanct.    • Alcohol use Yes      Comment: occassional      History   Drug Use No       FAMILY HISTORY  Family " "History   Problem Relation Age of Onset   • Diabetes Maternal Grandfather        CURRENT MEDICATIONS  Home Medications     Reviewed by Gareth Horn R.N. (Registered Nurse) on 02/26/19 at 1442  Med List Status: Not Addressed   Medication Last Dose Status   acetaminophen (TYLENOL) 325 MG Tab prn Active   ibuprofen (MOTRIN) 200 MG Tab prn Active                ALLERGIES  Allergies   Allergen Reactions   • Norco [Hydrocodone-Acetaminophen] Rash       PHYSICAL EXAM  VITAL SIGNS: /67   Pulse 70   Temp 36.7 °C (98.1 °F) (Temporal)   Resp 16   Ht 1.702 m (5' 7\")   Wt 75 kg (165 lb 5.5 oz)   SpO2 98%   BMI 25.90 kg/m²     Constitutional: Well developed, Well nourished, No acute distress, Non-toxic appearance.   HENT: Normocephalic, Moist mucous membranes, No oral exudates, no rhinorrhea. left upper and right lower premolar with tooth decay and surrounig erythema. Gingival erythema and edema to left upper and right lower premolars.  LYMPH: No cervical lymphadenopathy.   Eyes: PERRLA, EOMI, Conjunctiva normal, No discharge.   Neck: Normal range of motion, No cervical spine tenderness, Supple, No meningeus, No stridor.    DIAGNOSTIC STUDIES/PROCEDURES    COURSE & MEDICAL DECISION MAKING  Nursing notes, VS, PMSFHx reviewed in chart.    3:07 PM - Patient seen and examined at bedside. Her symptoms are consistent with dental abscess. Discussed follow up with a dentist or the [facility] clinic to have the teeth extracted. She will be discharged home with a course of antibiotics, Naprosyn, and care instructions. Patient verbalizes understanding and agreement to this plan of care.     This is a [facility] 23 y.o. female that presents with dentalgia secondary to left upper and right lower premolar infection and decay. The patient received prescription for penicillin, Naprosyn and will be following up with dental care in the near future.  Strict return precautions were given for possible abscess.  Prior to discharge, she is " positive p.o., she has no evidence of abscess, overwhelming infection, ENUG    DISPOSITION:  Patient will be discharged home in stable condition.    FOLLOW UP:  [facility], Emergency Dept  [address]  [place] [address] [phone]    If symptoms worsen      OUTPATIENT MEDICATIONS:  New Prescriptions    NAPROXEN (NAPROSYN) 500 MG TAB    Take 1 Tab by mouth 2 times a day, with meals.    PENICILLIN V POTASSIUM (VEETID) 500 MG TAB    Take 1 Tab by mouth 3 times a day for 7 days.       FINAL IMPRESSION  1. Pain, dental Active   2. Dental infection Active         IReese (Scribe), am scribing for, and in the presence of, Tobi Naidu D.O.    Electronically signed by: Reese Browne (Scribe), 2/26/2019    ITobi D.O. personally performed the services described in this documentation, as scribed by Reese Browne in my presence, and it is both accurate and complete.    The note accurately reflects work and decisions made by me.  Tobi Naidu  2/26/2019  3:40 PM

## 2019-02-26 NOTE — DISCHARGE INSTRUCTIONS
Please follow-up with a dentist as soon as possible.  Return to the emergency department if you have severe pain, nausea or vomiting, unable to swallow.

## 2019-02-26 NOTE — ED TRIAGE NOTES
Pt ambulatory to triage. Pt c/o L upper tooth pain. Pt states she is missing a tooth in this area. No swelling noted.     Chief Complaint   Patient presents with   • Tooth Ache     Pt placed in lobby, updated on triage process. Pt educated to notified RN or triage tech if changes in condition.

## 2019-02-26 NOTE — ED NOTES
Medicated per MAR, VSS.  Discharge instructions and prescriptions x 2 given- verbalizes understanding.   Ambulatory to lobby with steady gait.

## 2019-02-28 ENCOUNTER — HOSPITAL ENCOUNTER (EMERGENCY)
Facility: MEDICAL CENTER | Age: 24
End: 2019-02-28
Attending: EMERGENCY MEDICINE
Payer: MEDICAID

## 2019-02-28 VITALS
WEIGHT: 167.33 LBS | TEMPERATURE: 97.6 F | HEART RATE: 75 BPM | SYSTOLIC BLOOD PRESSURE: 137 MMHG | OXYGEN SATURATION: 97 % | RESPIRATION RATE: 16 BRPM | BODY MASS INDEX: 26.21 KG/M2 | DIASTOLIC BLOOD PRESSURE: 80 MMHG

## 2019-02-28 DIAGNOSIS — K08.89 PAIN, DENTAL: ICD-10-CM

## 2019-02-28 PROCEDURE — 700111 HCHG RX REV CODE 636 W/ 250 OVERRIDE (IP): Performed by: EMERGENCY MEDICINE

## 2019-02-28 PROCEDURE — 99284 EMERGENCY DEPT VISIT MOD MDM: CPT

## 2019-02-28 PROCEDURE — 96372 THER/PROPH/DIAG INJ SC/IM: CPT

## 2019-02-28 RX ORDER — KETOROLAC TROMETHAMINE 30 MG/ML
30 INJECTION, SOLUTION INTRAMUSCULAR; INTRAVENOUS ONCE
Status: COMPLETED | OUTPATIENT
Start: 2019-02-28 | End: 2019-02-28

## 2019-02-28 RX ADMIN — KETOROLAC TROMETHAMINE 30 MG: 30 INJECTION, SOLUTION INTRAMUSCULAR; INTRAVENOUS at 20:14

## 2019-02-28 ASSESSMENT — LIFESTYLE VARIABLES: DO YOU DRINK ALCOHOL: NO

## 2019-03-01 NOTE — ED PROVIDER NOTES
"ED Provider Note    Scribed for Madalyn Guerin M.D. by Ramsey Merchant. 2/28/2019, 7:55 PM.    Primary care provider: Pcp Pt States None  Means of arrival: Walk In  History obtained from: Patient  History limited by: None    CHIEF COMPLAINT  Chief Complaint   Patient presents with   • Dental Pain       HPI  Jose Antonio Wiggins is a 23 y.o. female who presents complaining of toothache.  It hurts in her back right molar the most.  It has been present for over a week, however the pain just started 2 hours ago.  Pain radiates locally.  Rates pain as moderate to severe.  It is worse to chew or bite down.  However, patient is able to take orals.  No associated breathing/swallowing difficulty.  There is no fever, there is no facial swelling.   There are no other complaints.    Trevon was seen here last week where she was given antibiotics and naprosyn to take, and was instructed to follow up with a dentist to have her tooth extracted. She made an appointment with her dentist, however had to get X-rays first and was unable to get her tooth extracted. She has returned at this time as her pain has worsened.    ROS:  Positive for dental pain, Negative for fever or swelling    PAST MEDICAL HISTORY   has a past medical history of Anemia (10/16/2012); Aortic septal defect (Diagnosed 3/2007); H/O heart surgery \"ASD\" Has implant card in media 2008 (6/20/2012); Heart murmur; and Proteinuria 2+ with voided specimen with first visit (6/20/2012).    FAMILY HISTORY  Family History   Problem Relation Age of Onset   • Diabetes Maternal Grandfather        SOCIAL HISTORY  Social History     Social History Main Topics   • Smoking status: Former Smoker     Packs/day: 0.25     Years: 2.00     Types: Cigarettes     Quit date: 10/1/2013   • Smokeless tobacco: Never Used      Comment: 1/2 pack a day. Pt. quit when she found out she was pregnanct.    • Alcohol use Yes      Comment: occassional   • Drug use: No   • Sexual activity: Yes     " Partners: Male      Comment: none       SURGICAL HISTORY   has a past surgical history that includes other cardiac surgery.    CURRENT MEDICATIONS  No current facility-administered medications on file prior to encounter.      Current Outpatient Prescriptions on File Prior to Encounter   Medication Sig Dispense Refill   • penicillin v potassium (VEETID) 500 MG Tab Take 1 Tab by mouth 3 times a day for 7 days. 21 Tab 0   • naproxen (NAPROSYN) 500 MG Tab Take 1 Tab by mouth 2 times a day, with meals. 60 Tab 0   • acetaminophen (TYLENOL) 325 MG Tab Take 650 mg by mouth every four hours as needed.     • ibuprofen (MOTRIN) 200 MG Tab Take 200 mg by mouth every 6 hours as needed.         ALLERGIES  Allergies   Allergen Reactions   • Norco [Hydrocodone-Acetaminophen] Rash       REVIEW OF SYSTEMS  See HPI for further details. Review of systems as above, otherwise all other systems are negative.     PHYSICAL EXAM  VITAL SIGNS: /77   Pulse 85   Temp 36.2 °C (97.2 °F) (Temporal)   Resp 18   Wt 75.9 kg (167 lb 5.3 oz)   SpO2 94%   BMI 26.21 kg/m²    Constitutional: Well appearing patient in mild distress from pain.  Not toxic, nor ill in appearance.  HENT: Mucus membranes moist.  Oropharynx is clear.  There are scattered caries.  There is no facial edema.  There is tenderness over the back right molar.  Tongue is normal.  Floor of the mouth is normal.  Submental space is soft.  Posterior pharynx is normal.  Patient is tolerating secretions without difficulty.  Eyes: Pupils equally round.    Neck: Full nontender range of motion; no meningismus.   Lymphatic: No cervical lymphadenopathy noted.   Skin: No purpura nor petechia noted.  Extremities/Musculoskeletal: No sign of trauma.  Psychiatric: Normal affect appropriate for the clinical situation.    COURSE & MEDICAL DECISION MAKING  I have reviewed the recorded medical record information.    This patient presents with a toothache.  There is no obvious dental abscess at  this time which I can drain.  They are asked to follow up with a dentist at soonest possibility.  They are counseled that they may get worse before getting better and to return for increasing pain or swelling, breathing/swallowing difficulty, fever, any other concern at all.  They are given aftercare instructions on toothache, a dental referral sheet, and narcotic cautions.     Jose Antonio Wiggins   Home Medication Instructions MAGNO:[id number]    Printed on:02/28/19 1956   Medication Information                      acetaminophen (TYLENOL) 325 MG Tab  Take 650 mg by mouth every four hours as needed.             ibuprofen (MOTRIN) 200 MG Tab  Take 200 mg by mouth every 6 hours as needed.             naproxen (NAPROSYN) 500 MG Tab  Take 1 Tab by mouth 2 times a day, with meals.             penicillin v potassium (VEETID) 500 MG Tab  Take 1 Tab by mouth 3 times a day for 7 days.                 FINAL IMPRESSION  1. Acute toothache  2. Dental caries       IRamsey (Fadumo), am scribing for, and in the presence of, Madalyn Guerin M.D..    Electronically signed by: Ramsey Merchant (Fadumo), 2/28/2019    IMadalyn M.D. personally performed the services described in this documentation, as scribed by Ramsey Merchant in my presence, and it is both accurate and complete. E.    The note accurately reflects work and decisions made by me.  Madalyn Guerin  2/28/2019  8:22 PM

## 2019-03-01 NOTE — ED NOTES
No s/s of adverse reaction to Toradol VSS. Pt to DC home with DC instruction. Dental referral list provided. Educated on when to return to ED for worsening s/s

## 2019-03-01 NOTE — ED TRIAGE NOTES
Jose Antonio Wiggins  23 y.o.  female  Chief Complaint   Patient presents with   • Dental Pain     C/o dental pain ( left upper ) x 2 hrs. Seen here for the same last Monday.

## 2019-04-18 ENCOUNTER — HOSPITAL ENCOUNTER (EMERGENCY)
Facility: MEDICAL CENTER | Age: 24
End: 2019-04-18
Attending: EMERGENCY MEDICINE
Payer: MEDICAID

## 2019-04-18 ENCOUNTER — APPOINTMENT (OUTPATIENT)
Dept: RADIOLOGY | Facility: MEDICAL CENTER | Age: 24
End: 2019-04-18
Attending: EMERGENCY MEDICINE
Payer: MEDICAID

## 2019-04-18 VITALS
BODY MASS INDEX: 25.92 KG/M2 | HEIGHT: 67 IN | SYSTOLIC BLOOD PRESSURE: 124 MMHG | WEIGHT: 165.12 LBS | DIASTOLIC BLOOD PRESSURE: 68 MMHG | TEMPERATURE: 97.1 F | OXYGEN SATURATION: 99 % | RESPIRATION RATE: 16 BRPM | HEART RATE: 71 BPM

## 2019-04-18 DIAGNOSIS — M79.651 PAIN OF RIGHT THIGH: ICD-10-CM

## 2019-04-18 DIAGNOSIS — M79.609 MUSCULOSKELETAL LIMB PAIN: ICD-10-CM

## 2019-04-18 PROCEDURE — 93971 EXTREMITY STUDY: CPT | Mod: RT

## 2019-04-18 PROCEDURE — 99284 EMERGENCY DEPT VISIT MOD MDM: CPT

## 2019-04-18 RX ORDER — IBUPROFEN 800 MG/1
800 TABLET ORAL EVERY 8 HOURS PRN
Qty: 30 TAB | Refills: 0 | Status: SHIPPED | OUTPATIENT
Start: 2019-04-18 | End: 2020-02-29

## 2019-04-18 NOTE — ED PROVIDER NOTES
ED Provider Note    Scribed for Augusto Barajas M.D. by Tuan Ackerman. 4/18/2019  10:27 AM    Primary care provider: Pcp Pt States None  Means of arrival: Walk in  History obtained from: Patient  History limited by: None    CHIEF COMPLAINT  Chief Complaint   Patient presents with   • Leg Pain       HPI  Jose Antonio Wiggins is a 24 y.o. female who presents to the Emergency Department with chief complaint of right thigh pain that developed yesterday. Patient is a  and her pain developed while working on her feet. She describes the pain last night as a tight, burning, and swelling sensation which has since resolved. She only has pain in her leg currently. While walking her pain is 8-9/10, and while resting her pain is a 6/10. Patient denies any known trauma to the area or abnormal overuse while exercising. She took 800mg motrin last night before going to sleep, and woke up without pain relief. She has never had this pain before in the past. Denies any back pain, fever, chills, diarrhea, dysuria, nausea, vomiting, body aches, chest pain or shortness of breath. Patient denies history of vaginal or urinary infections, as well as skin infections or history of IVDU. She denies the chance of pregnancy as she receives depo provera injections in her buttocks, with her last injection 2 months ago. She has had 2 vaginal deliveries in the past, and is currently sexually active with one partner. She denies history of blood clots in her legs or lungs in the past, or a known family history of blood clots or clotting disorders. Other than a septal defect surgery she had as a child, she has had no major surgeries. She used to be an occasional smoker and drinker, however quit drinking 3 months ago.      REVIEW OF SYSTEMS  Pertinent positives include: thigh pain.  Pertinent negatives include: back pain, fever, chills, diarrhea, dysuria, nausea, vomiting, body aches, chest pain or shortness of breath.       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 Pt. quit when she found out she was pregnanct.    • Alcohol use Yes      Comment: occassional     History   Drug Use No       SURGICAL HISTORY  Past Surgical History:   Procedure Laterality Date   • OTHER CARDIAC SURGERY      as a child       CURRENT MEDICATIONS  Home Medications     Reviewed by Rachelle Klein R.N. (Registered Nurse) on 04/18/19 at 0945  Med List Status: Partial   Medication Last Dose Status   acetaminophen (TYLENOL) 325 MG Tab  Active   ibuprofen (MOTRIN) 200 MG Tab  Active   naproxen (NAPROSYN) 500 MG Tab  Active                ALLERGIES  Allergies   Allergen Reactions   • Norco [Hydrocodone-Acetaminophen] Rash       PHYSICAL EXAM  VITAL SIGNS: /68   Pulse 84   Temp 36.2 °C (97.1 °F) (Temporal)   Resp 18   Ht 1.702 m (5' 7\")   Wt 74.9 kg (165 lb 2 oz)   SpO2 97%   BMI 25.86 kg/m²   Reviewed  Constitutional: Well developed, Well nourished, lying comfortably.  HENT: Normocephalic, atraumatic, bilateral external ears normal, oropharynx moist, No exudates or erythema.   Eyes: PERRLA, conjunctiva pink, no scleral icterus.   Cardiovascular: Regular rate and rhythm. No murmurs, rubs or gallops.   Respiratory: Lungs clear to auscultation bilaterally. No wheezes, rales, or rhonchi.   Abdominal:  Abdomen soft, non-tender, non distended. No rebound, or " guarding.    Skin: No erythema, no rash.   Genitourinary: No costovertebral angle tenderness.     Musculoskeletal:     Tenderness to anterior aspect of mid thigh,   no masses, no edema.   Extending knee against resistance elicits pain.   No erythema    Neurologic: Alert & oriented x 3, cranial nerves 2-12 intact by passive exam.  No focal deficit noted.  Psychiatric: Affect normal, Judgment normal, Mood normal.       DIFFERENTIAL DIAGNOSIS:  Differentials include  Musculoskeletal strain  Meralgia paresthetica  DVT            RADIOLOGY/PROCEDURES  US-EXTREMITY VENOUS LOWER UNILAT RIGHT   Final Result        Radiologist interpretation have been reviewed by me.     LABORATORY:  none    INTERVENTIONS:  Medications - No data to display  Response:.    ED COURSE:  Nursing notes, VS, PMSFHx reviewed in chart.     10:27 AM - Patient seen and examined at bedside. Patient will be treated with Motrin 800 mg for her symptoms. Ordered US extremity  to evaluate.     DVT Ultrasound shows:    Right lower extremity -   Complete color filling and compressibility with normal venous flow dynamics    including spontaneous flow, response to augmentation maneuvers, and    respiratory phasicity.      Flow was evaluated in the contralateral common femoral vein and normal    venous flow dynamics including spontaneous flow, respiratory phasic    variation and augmentation were demonstrated.       MEDICAL DECISION MAKING:    Patient's pain in the right thigh is more of musculoskeletal origin.  Her ultrasound of the extremities is negative for any embolism or DVT, she is given a course of ibuprofen and advised to apply ice cold packs.     She is advised to return to the ED if there is exacerbation of her pain or redness or fever chills chest pain shortness of breath.    Patient is discharged to home in stable condition.  Letter for leave of absence given.          PLAN:  Discharge Medication List as of 4/18/2019 11:51 AM      START taking these  medications    Details   ibuprofen (MOTRIN) 800 MG Tab Take 1 Tab by mouth every 8 hours as needed., Disp-30 Tab, R-0, Print Rx Paper             handout given  Please return to ED if you develop any increasing swelling, redness,  fever,  chest pain,  shortness of breath or worsening symptoms, or follow up with your primary care Dr. KYLIE HAWTHORNE  [address] [place] [id number]  [phone]  Schedule an appointment as soon as possible for a visit in 1 week  As needed      CONDITION: Stable    FINAL IMPRESSION  1. Pain of right thigh    2. Musculoskeletal limb pain          Tuan LAY (Scribgriffin), am scribing for, and in the presence of, Augusto Barajas M.D..    Electronically signed by: Tuan Ackerman (Yanibe), 4/18/2019    IAugusto M.D. personally performed the services described in this documentation, as scribed by Tuan Ackerman in my presence, and it is both accurate and complete. E.    I independently evaluated the patient and repeated the important components of the history and physical. I discussed the management with the resident. I have reviewed and agree with the pertinent clinical information above including history, exam, study findings, and recommendations.  I saw patient with Dr. Hunt, resident.    Electronically signed by: Augusto Barajas, 4/19/2019 11:55 AM

## 2019-04-18 NOTE — DISCHARGE INSTRUCTIONS
Please return to ED if you develop any increasing swelling, redness,  fever,  chest pain,  shortness of breath or worsening symptoms, or follow up with your primary care

## 2019-04-18 NOTE — ED NOTES
Pt discharged to home.  Pt given discharge paperwork and all applicable prescriptions written by provider.  Pt to follow with PCP, when indicated.  Pt verbalizes understanding of discharge teaching and will return to ED if condition worsens.    '

## 2019-04-18 NOTE — ED TRIAGE NOTES
"Pt ambulatory to triage c/o right upper thigh pain that she noticed while at work 2 days ago. Pt states area feels \"lumpy\" denies swelling or redness. nad  "

## 2020-01-02 ENCOUNTER — APPOINTMENT (OUTPATIENT)
Dept: RADIOLOGY | Facility: MEDICAL CENTER | Age: 25
End: 2020-01-02
Attending: EMERGENCY MEDICINE
Payer: MEDICAID

## 2020-01-02 ENCOUNTER — HOSPITAL ENCOUNTER (EMERGENCY)
Facility: MEDICAL CENTER | Age: 25
End: 2020-01-02
Attending: EMERGENCY MEDICINE
Payer: MEDICAID

## 2020-01-02 VITALS
SYSTOLIC BLOOD PRESSURE: 128 MMHG | RESPIRATION RATE: 16 BRPM | DIASTOLIC BLOOD PRESSURE: 78 MMHG | BODY MASS INDEX: 26.99 KG/M2 | OXYGEN SATURATION: 99 % | HEIGHT: 67 IN | HEART RATE: 63 BPM | WEIGHT: 171.96 LBS | TEMPERATURE: 98.1 F

## 2020-01-02 DIAGNOSIS — R05.9 COUGH: ICD-10-CM

## 2020-01-02 PROCEDURE — 99283 EMERGENCY DEPT VISIT LOW MDM: CPT

## 2020-01-02 PROCEDURE — 71045 X-RAY EXAM CHEST 1 VIEW: CPT

## 2020-01-02 RX ORDER — BENZONATATE 100 MG/1
100 CAPSULE ORAL 3 TIMES DAILY PRN
Qty: 60 CAP | Refills: 0 | Status: SHIPPED | OUTPATIENT
Start: 2020-01-02 | End: 2020-02-29

## 2020-01-03 NOTE — ED PROVIDER NOTES
"ED Provider Note    CHIEF COMPLAINT  Chief Complaint   Patient presents with   • Runny Nose   • Flu Like Symptoms     started today feeling bad        HPI  Jose Antonio Wiggins is a 24 y.o. female who presents with runny nose as well as flulike symptoms and cough.  The cough is productive of green sputum.  Her child's had pneumonia.  She has no history of sepsis.  She is able to eat and drink normally.  This been going on for the past 2 to 3 days.    REVIEW OF SYSTEMS  See HPI for further details. All other systems are negative.     PAST MEDICAL HISTORY   has a past medical history of Anemia (10/16/2012), Aortic septal defect (Diagnosed 3/2007), H/O heart surgery \"ASD\" Has implant card in media  (2012), Heart murmur, and Proteinuria 2+ with voided specimen with first visit (2012).    SOCIAL HISTORY  Social History     Tobacco Use   • Smoking status: Former Smoker     Packs/day: 0.25     Years: 2.00     Pack years: 0.50     Types: Cigarettes     Last attempt to quit: 10/1/2013     Years since quittin.2   • Smokeless tobacco: Never Used   • Tobacco comment: 1/2 pack a day. Pt. quit when she found out she was pregnanct.    Substance and Sexual Activity   • Alcohol use: Yes     Comment: occassional   • Drug use: No   • Sexual activity: Yes     Partners: Male     Comment: none       SURGICAL HISTORY   has a past surgical history that includes other cardiac surgery.    CURRENT MEDICATIONS  Home Medications     Reviewed by Ashley Hargrove R.N. (Registered Nurse) on 20 at 1933  Med List Status: Not Addressed   Medication Last Dose Status   acetaminophen (TYLENOL) 325 MG Tab  Active   ibuprofen (MOTRIN) 800 MG Tab  Active                ALLERGIES  Allergies   Allergen Reactions   • Norco [Hydrocodone-Acetaminophen] Rash     rash       PHYSICAL EXAM  VITAL SIGNS: /78   Pulse 63   Temp 36.7 °C (98.1 °F) (Temporal)   Resp 16   Ht 1.702 m (5' 7\")   Wt 78 kg (171 lb 15.3 oz)   SpO2 99%   " BMI 26.93 kg/m²  @BETTY[[id number]::@  Pulse ox interpretation: I interpret this pulse ox as normal.  Constitutional: Alert in no apparent distress.  HENT: Normocephalic, Atraumatic, Bilateral external ears normal. Nose normal.   Eyes: Pupils are equal and reactive. Conjunctiva normal, non-icteric.   Heart: Regular rate and rythm, no murmurs.    Lungs: Scattered rhonchi.  Skin: Warm, Dry, No erythema, No rash.   Neurologic: Alert, Grossly non-focal.   Psychiatric: Affect normal, Judgment normal, Mood normal, Appears appropriate and not intoxicated.           COURSE & MEDICAL DECISION MAKING  Pertinent Labs & Imaging studies reviewed. (See chart for details)    24-year-old female who presents with cough as well as some shortness of breath.  There is some concern for pneumonia on exam.  I will get an x-ray to assess for pneumonia pneumothorax.  I will reassess after this.    DX-CHEST-PORTABLE (1 VIEW)   Final Result      No radiographic evidence of acute cardiopulmonary process.          Negative chest x-ray.  Will discharge home with strict return precautions and follow-up.    The patient will not drink alcohol nor drive with prescribed medications. The patient will return for worsening symptoms and is stable at the time of discharge. The patient verbalizes understanding and will comply.    FINAL IMPRESSION  1. Cough              Electronically signed by: Reji Christopher, 1/2/2020 7:50 PM

## 2020-01-03 NOTE — ED NOTES
Pt discharged with instructions and escript.  Pt verbalized understanding of discharge instructions.  Pt ambulated out of ED

## 2020-01-03 NOTE — ED TRIAGE NOTES
Pt comes in c/o getting sick to day  Both her boys are ill  Mucus draining down her throat  Runny nose and not feeling well  Pt was recently Dx w/ PNX

## 2020-01-26 ENCOUNTER — HOSPITAL ENCOUNTER (EMERGENCY)
Facility: MEDICAL CENTER | Age: 25
End: 2020-01-26
Attending: EMERGENCY MEDICINE
Payer: MEDICAID

## 2020-01-26 ENCOUNTER — APPOINTMENT (OUTPATIENT)
Dept: RADIOLOGY | Facility: MEDICAL CENTER | Age: 25
End: 2020-01-26
Attending: EMERGENCY MEDICINE
Payer: MEDICAID

## 2020-01-26 VITALS
SYSTOLIC BLOOD PRESSURE: 131 MMHG | BODY MASS INDEX: 27.34 KG/M2 | HEIGHT: 67 IN | OXYGEN SATURATION: 99 % | DIASTOLIC BLOOD PRESSURE: 70 MMHG | TEMPERATURE: 97.3 F | RESPIRATION RATE: 17 BRPM | WEIGHT: 174.16 LBS | HEART RATE: 100 BPM

## 2020-01-26 DIAGNOSIS — J10.1 INFLUENZA A: ICD-10-CM

## 2020-01-26 DIAGNOSIS — R07.9 CHEST PAIN, UNSPECIFIED TYPE: ICD-10-CM

## 2020-01-26 LAB
ALBUMIN SERPL BCP-MCNC: 4.4 G/DL (ref 3.2–4.9)
ALBUMIN/GLOB SERPL: 1.4 G/DL
ALP SERPL-CCNC: 69 U/L (ref 30–99)
ALT SERPL-CCNC: 9 U/L (ref 2–50)
ANION GAP SERPL CALC-SCNC: 12 MMOL/L (ref 0–11.9)
AST SERPL-CCNC: 15 U/L (ref 12–45)
BASOPHILS # BLD AUTO: 0.6 % (ref 0–1.8)
BASOPHILS # BLD: 0.04 K/UL (ref 0–0.12)
BILIRUB SERPL-MCNC: 0.2 MG/DL (ref 0.1–1.5)
BUN SERPL-MCNC: 11 MG/DL (ref 8–22)
CALCIUM SERPL-MCNC: 9.1 MG/DL (ref 8.4–10.2)
CHLORIDE SERPL-SCNC: 105 MMOL/L (ref 96–112)
CO2 SERPL-SCNC: 23 MMOL/L (ref 20–33)
CREAT SERPL-MCNC: 0.66 MG/DL (ref 0.5–1.4)
EKG IMPRESSION: NORMAL
EOSINOPHIL # BLD AUTO: 0.32 K/UL (ref 0–0.51)
EOSINOPHIL NFR BLD: 4.8 % (ref 0–6.9)
ERYTHROCYTE [DISTWIDTH] IN BLOOD BY AUTOMATED COUNT: 39.6 FL (ref 35.9–50)
FLUAV RNA SPEC QL NAA+PROBE: POSITIVE
FLUBV RNA SPEC QL NAA+PROBE: NEGATIVE
GLOBULIN SER CALC-MCNC: 3.1 G/DL (ref 1.9–3.5)
GLUCOSE SERPL-MCNC: 89 MG/DL (ref 65–99)
HCG SERPL QL: NEGATIVE
HCT VFR BLD AUTO: 38.7 % (ref 37–47)
HGB BLD-MCNC: 12.4 G/DL (ref 12–16)
IMM GRANULOCYTES # BLD AUTO: 0.02 K/UL (ref 0–0.11)
IMM GRANULOCYTES NFR BLD AUTO: 0.3 % (ref 0–0.9)
LYMPHOCYTES # BLD AUTO: 2.14 K/UL (ref 1–4.8)
LYMPHOCYTES NFR BLD: 32.1 % (ref 22–41)
MCH RBC QN AUTO: 25.9 PG (ref 27–33)
MCHC RBC AUTO-ENTMCNC: 32 G/DL (ref 33.6–35)
MCV RBC AUTO: 80.8 FL (ref 81.4–97.8)
MONOCYTES # BLD AUTO: 0.32 K/UL (ref 0–0.85)
MONOCYTES NFR BLD AUTO: 4.8 % (ref 0–13.4)
NEUTROPHILS # BLD AUTO: 3.82 K/UL (ref 2–7.15)
NEUTROPHILS NFR BLD: 57.4 % (ref 44–72)
NRBC # BLD AUTO: 0 K/UL
NRBC BLD-RTO: 0 /100 WBC
PLATELET # BLD AUTO: 214 K/UL (ref 164–446)
PMV BLD AUTO: 8.9 FL (ref 9–12.9)
POTASSIUM SERPL-SCNC: 3.7 MMOL/L (ref 3.6–5.5)
PROT SERPL-MCNC: 7.5 G/DL (ref 6–8.2)
RBC # BLD AUTO: 4.79 M/UL (ref 4.2–5.4)
SODIUM SERPL-SCNC: 140 MMOL/L (ref 135–145)
TROPONIN T SERPL-MCNC: <6 NG/L (ref 6–19)
WBC # BLD AUTO: 6.7 K/UL (ref 4.8–10.8)

## 2020-01-26 PROCEDURE — 85025 COMPLETE CBC W/AUTO DIFF WBC: CPT

## 2020-01-26 PROCEDURE — 80053 COMPREHEN METABOLIC PANEL: CPT

## 2020-01-26 PROCEDURE — 700111 HCHG RX REV CODE 636 W/ 250 OVERRIDE (IP)

## 2020-01-26 PROCEDURE — 71045 X-RAY EXAM CHEST 1 VIEW: CPT

## 2020-01-26 PROCEDURE — 84484 ASSAY OF TROPONIN QUANT: CPT

## 2020-01-26 PROCEDURE — 84703 CHORIONIC GONADOTROPIN ASSAY: CPT

## 2020-01-26 PROCEDURE — 700105 HCHG RX REV CODE 258: Performed by: EMERGENCY MEDICINE

## 2020-01-26 PROCEDURE — 36415 COLL VENOUS BLD VENIPUNCTURE: CPT

## 2020-01-26 PROCEDURE — 96374 THER/PROPH/DIAG INJ IV PUSH: CPT

## 2020-01-26 PROCEDURE — 93005 ELECTROCARDIOGRAM TRACING: CPT | Performed by: EMERGENCY MEDICINE

## 2020-01-26 PROCEDURE — 87502 INFLUENZA DNA AMP PROBE: CPT

## 2020-01-26 PROCEDURE — 99284 EMERGENCY DEPT VISIT MOD MDM: CPT

## 2020-01-26 PROCEDURE — 93005 ELECTROCARDIOGRAM TRACING: CPT

## 2020-01-26 RX ORDER — OSELTAMIVIR PHOSPHATE 75 MG/1
75 CAPSULE ORAL 2 TIMES DAILY
Qty: 10 CAP | Refills: 0 | Status: SHIPPED | OUTPATIENT
Start: 2020-01-26 | End: 2020-02-29

## 2020-01-26 RX ORDER — SODIUM CHLORIDE 9 MG/ML
1000 INJECTION, SOLUTION INTRAVENOUS ONCE
Status: COMPLETED | OUTPATIENT
Start: 2020-01-26 | End: 2020-01-26

## 2020-01-26 RX ORDER — KETOROLAC TROMETHAMINE 30 MG/ML
INJECTION, SOLUTION INTRAMUSCULAR; INTRAVENOUS
Status: COMPLETED
Start: 2020-01-26 | End: 2020-01-26

## 2020-01-26 RX ORDER — KETOROLAC TROMETHAMINE 30 MG/ML
15 INJECTION, SOLUTION INTRAMUSCULAR; INTRAVENOUS ONCE
Status: COMPLETED | OUTPATIENT
Start: 2020-01-26 | End: 2020-01-26

## 2020-01-26 RX ADMIN — KETOROLAC TROMETHAMINE 15 MG: 30 INJECTION, SOLUTION INTRAMUSCULAR; INTRAVENOUS at 17:35

## 2020-01-26 RX ADMIN — SODIUM CHLORIDE 1000 ML: 9 INJECTION, SOLUTION INTRAVENOUS at 17:35

## 2020-01-26 ASSESSMENT — ENCOUNTER SYMPTOMS
SORE THROAT: 1
SHORTNESS OF BREATH: 1
DIZZINESS: 1
COUGH: 1
FEVER: 0
SPUTUM PRODUCTION: 0
CHILLS: 0

## 2020-01-27 NOTE — ED TRIAGE NOTES
"Presents complaining of acute onset of central chest pain with associated dyspnea recurring since yesterday.  Chief Complaint   Patient presents with   • Chest Pain   • Shortness of Breath     /82   Pulse 94   Temp 36.3 °C (97.3 °F) (Temporal)   Resp 18   Ht 1.702 m (5' 7\")   Wt 79 kg (174 lb 2.6 oz)   SpO2 98%   BMI 27.28 kg/m²     "

## 2020-01-27 NOTE — ED PROVIDER NOTES
"ED Provider Note    CHIEF COMPLAINT  Chief Complaint   Patient presents with   • Chest Pain   • Shortness of Breath       HPI  Jose Antonio Wiggins is a 24 y.o. female with a history of an ASD status post repair presents to the emergency department for evaluation of chest pain.  Patient reports that her chest pain began yesterday during a coughing fit.  She describes it as located in the center of her sternum and worse with coughing or deep breathing.  Patient describes it as a pressure.  Patient has been coughing since yesterday and had associated malaise, congestion, and lightheadedness while at work.  She denies any palpitations, but states that she has been short of breath.  She has no prior history of blood clots, denies any leg swelling.  She has not taken any medications for her symptoms.    Patient does report that 2 of her children have recently been diagnosed with influenza and 1 of them recently had pneumonia.    Patient is currently on Depo-Provera for contraception.  She denies any possibility being pregnant.    REVIEW OF SYSTEMS  Review of Systems   Constitutional: Negative for chills and fever.   HENT: Positive for congestion and sore throat.    Respiratory: Positive for cough and shortness of breath. Negative for sputum production.    Cardiovascular: Positive for chest pain.   Neurological: Positive for dizziness.   All other systems reviewed and are negative.      PAST MEDICAL HISTORY   has a past medical history of Anemia (10/16/2012), Aortic septal defect (Diagnosed 3/2007), H/O heart surgery \"ASD\" Has implant card in media  (2012), Heart murmur, and Proteinuria 2+ with voided specimen with first visit (2012).    SOCIAL HISTORY  Social History     Tobacco Use   • Smoking status: Former Smoker     Packs/day: 0.25     Years: 2.00     Pack years: 0.50     Types: Cigarettes     Last attempt to quit: 10/1/2013     Years since quittin.3   • Smokeless tobacco: Never Used   • Tobacco " "comment: 1/2 pack a day. Pt. quit when she found out she was pregnanct.    Substance and Sexual Activity   • Alcohol use: Yes     Comment: occassional   • Drug use: No   • Sexual activity: Yes     Partners: Male     Comment: none       SURGICAL HISTORY   has a past surgical history that includes other cardiac surgery.    CURRENT MEDICATIONS  Home Medications    **Home medications have not yet been reviewed for this encounter**         ALLERGIES  Allergies   Allergen Reactions   • Norco [Hydrocodone-Acetaminophen] Rash     rash       PHYSICAL EXAM  VITAL SIGNS: /82   Pulse 94   Temp 36.3 °C (97.3 °F) (Temporal)   Resp 18   Ht 1.702 m (5' 7\")   Wt 79 kg (174 lb 2.6 oz)   SpO2 98%   BMI 27.28 kg/m²    Pulse ox interpretation: I interpret this pulse ox as normal.    Physical Exam   Constitutional: She is oriented to person, place, and time and well-developed, well-nourished, and in no distress.   HENT:   Head: Normocephalic and atraumatic.   Mouth/Throat: Oropharynx is clear and moist. No oropharyngeal exudate.   Eyes: Pupils are equal, round, and reactive to light. Conjunctivae are normal.   Neck: Normal range of motion. Neck supple.   Cardiovascular: Normal rate, regular rhythm and intact distal pulses.   Pulmonary/Chest: Effort normal and breath sounds normal. She has no wheezes. She has no rales.   Frequent dry cough   Abdominal: Soft. Bowel sounds are normal. She exhibits no distension. There is no tenderness.   Musculoskeletal: Normal range of motion.         General: No deformity or edema.   Lymphadenopathy:     She has no cervical adenopathy.   Neurological: She is alert and oriented to person, place, and time. GCS score is 15.   Skin: Skin is warm and dry. She is not diaphoretic.   Psychiatric: Affect and judgment normal.   Nursing note and vitals reviewed.        DIAGNOSTIC STUDIES  Results for orders placed or performed during the hospital encounter of 01/26/20   CBC with Differential   Result " Value Ref Range    WBC 6.7 4.8 - 10.8 K/uL    RBC 4.79 4.20 - 5.40 M/uL    Hemoglobin 12.4 12.0 - 16.0 g/dL    Hematocrit 38.7 37.0 - 47.0 %    MCV 80.8 (L) 81.4 - 97.8 fL    MCH 25.9 (L) 27.0 - 33.0 pg    MCHC 32.0 (L) 33.6 - 35.0 g/dL    RDW 39.6 35.9 - 50.0 fL    Platelet Count 214 164 - 446 K/uL    MPV 8.9 (L) 9.0 - 12.9 fL    Neutrophils-Polys 57.40 44.00 - 72.00 %    Lymphocytes 32.10 22.00 - 41.00 %    Monocytes 4.80 0.00 - 13.40 %    Eosinophils 4.80 0.00 - 6.90 %    Basophils 0.60 0.00 - 1.80 %    Immature Granulocytes 0.30 0.00 - 0.90 %    Nucleated RBC 0.00 /100 WBC    Neutrophils (Absolute) 3.82 2.00 - 7.15 K/uL    Lymphs (Absolute) 2.14 1.00 - 4.80 K/uL    Monos (Absolute) 0.32 0.00 - 0.85 K/uL    Eos (Absolute) 0.32 0.00 - 0.51 K/uL    Baso (Absolute) 0.04 0.00 - 0.12 K/uL    Immature Granulocytes (abs) 0.02 0.00 - 0.11 K/uL    NRBC (Absolute) 0.00 K/uL   Complete Metabolic Panel (CMP)   Result Value Ref Range    Sodium 140 135 - 145 mmol/L    Potassium 3.7 3.6 - 5.5 mmol/L    Chloride 105 96 - 112 mmol/L    Co2 23 20 - 33 mmol/L    Anion Gap 12.0 (H) 0.0 - 11.9    Glucose 89 65 - 99 mg/dL    Bun 11 8 - 22 mg/dL    Creatinine 0.66 0.50 - 1.40 mg/dL    Calcium 9.1 8.4 - 10.2 mg/dL    AST(SGOT) 15 12 - 45 U/L    ALT(SGPT) 9 2 - 50 U/L    Alkaline Phosphatase 69 30 - 99 U/L    Total Bilirubin 0.2 0.1 - 1.5 mg/dL    Albumin 4.4 3.2 - 4.9 g/dL    Total Protein 7.5 6.0 - 8.2 g/dL    Globulin 3.1 1.9 - 3.5 g/dL    A-G Ratio 1.4 g/dL   Troponin   Result Value Ref Range    Troponin T <6 6 - 19 ng/L   HCG QUAL SERUM   Result Value Ref Range    Beta-Hcg Qualitative Serum Negative Negative   Influenza A/B By PCR (Adult - Flu Only)   Result Value Ref Range    Influenza virus A RNA POSITIVE (A) Negative    Influenza virus B, PCR Negative Negative   ESTIMATED GFR   Result Value Ref Range    GFR If African American >60 >60 mL/min/1.73 m 2    GFR If Non African American >60 >60 mL/min/1.73 m 2   EKG   Result Value Ref  Range    Report       [facility] Emergency Dept.    Test Date:  2020  Pt Name:    MIYA ULLOA               Department: [facility]  MRN:        [id number]                      Room:  Gender:     Female                       Technician: GT  :        [id number]                   Requested By:ER TRIAGE PROTOCOL  Order #:    [id number]                    Reading MD: Jody Newton MD    Measurements  Intervals                                Axis  Rate:       85                           P:          9  DE:         145                          QRS:        47  QRSD:       86                           T:          30  QT:         354  QTc:        421    Interpretive Statements  Sinus rhythm  Normal axis and intervals. No ectopy. No ST or T wave deviation.  Compared to ECG 2018 16:37:09  No significant changes  Electronically Signed On 2020 16:50:32 PST by Jody Newton MD         DX-CHEST-PORTABLE (1 VIEW)   Final Result      1.  No acute cardiopulmonary disease.   2.  Intracardiac device again noted.            COURSE & MEDICAL DECISION MAKING  Pertinent Labs & Imaging studies reviewed. (See chart for details)  24-year-old female presents emergency department for evaluation of chest pain.  On my initial examination, she was well-appearing with normal vital signs though did have mildly increased blood pressure for age.  She had reproducible central chest pain on examination.  She had a frequent cough.  Differential diagnosis includes but is not limited to viral upper respiratory infection, costochondritis, pericarditis, pneumonia, influenza    Patient is PERC negative (age <50, HR <100, O2 saturation >95%, no unilateral leg swelling, no hemoptysis, no surgery/trauma in last 4 weeks, no history of prior DVT/PE, no exogenous estrogen use), making pulmonary embolus unlikely, therefore further diagnostic workup for pulmonary embolus is not performed at this time.    EKG was obtained per  triage protocol and showed no evidence of acute ischemia or infarction.  There was no ST deviation to suggest pericarditis.  IV access was obtained and laboratory studies were drawn.  Chest x-ray was performed showing no acute cardiopulmonary disease, though did demonstrate the patient's ASD closure device.     HYDRATION: Based on the patient's presentation of Dehydration and Other lightheadedness the patient was given IV fluids. IV Hydration was used because oral hydration was not as rapid as required. Upon recheck following hydration, the patient was improved.  She was also given ketorolac for her likely musculoskeletal chest pain.    Laboratory studies revealed that the patient was positive for influenza A.  Electrolyte panel was unremarkable, pregnancy test was negative, and patient no significant leukocytosis or anemia on CBC.  Troponin testing was performed per triage protocol and was undetectable at less than 6 making ACS unlikely.    Patient symptoms are likely secondary to influenza.  I did discuss with her the usual disease course and return precautions.  After discussing the risks and benefits of Tamiflu, patient did request a prescription for this medication.  I believe that her symptoms started yesterday evening, and she is still within the window of treatment with Tamiflu.    The patient will return for new or worsening symptoms and is stable at the time of discharge.    The patient is referred to a primary physician for blood pressure management, diabetic screening, and for all other preventative health concerns.    DISPOSITION:  Patient will be discharged home in stable condition.    FOLLOW UP:  [facility], Emergency Dept  [address]  [place] [address] [phone]    If symptoms worsen      OUTPATIENT MEDICATIONS:  New Prescriptions    OSELTAMIVIR (TAMIFLU) 75 MG CAP    Take 1 Cap by mouth 2 times a day.          FINAL IMPRESSION  Visit Diagnoses     ICD-10-CM    1. Chest pain, unspecified type R07.9   2. Influenza A J10.1              Electronically signed by: Jody Newton M.D., 1/26/2020 4:59 PM

## 2020-02-29 ENCOUNTER — HOSPITAL ENCOUNTER (EMERGENCY)
Facility: MEDICAL CENTER | Age: 25
End: 2020-02-29
Attending: EMERGENCY MEDICINE
Payer: MEDICAID

## 2020-02-29 VITALS
WEIGHT: 167.55 LBS | SYSTOLIC BLOOD PRESSURE: 118 MMHG | DIASTOLIC BLOOD PRESSURE: 60 MMHG | TEMPERATURE: 97.3 F | HEIGHT: 67 IN | HEART RATE: 85 BPM | OXYGEN SATURATION: 98 % | BODY MASS INDEX: 26.3 KG/M2 | RESPIRATION RATE: 18 BRPM

## 2020-02-29 DIAGNOSIS — J02.9 PHARYNGITIS, UNSPECIFIED ETIOLOGY: ICD-10-CM

## 2020-02-29 LAB
HETEROPH AB SER QL: NEGATIVE
S PYO AG THROAT QL: NORMAL
SIGNIFICANT IND 70042: NORMAL
SITE SITE: NORMAL
SOURCE SOURCE: NORMAL

## 2020-02-29 PROCEDURE — 87880 STREP A ASSAY W/OPTIC: CPT

## 2020-02-29 PROCEDURE — 86308 HETEROPHILE ANTIBODY SCREEN: CPT

## 2020-02-29 PROCEDURE — 99284 EMERGENCY DEPT VISIT MOD MDM: CPT

## 2020-02-29 PROCEDURE — 87081 CULTURE SCREEN ONLY: CPT

## 2020-02-29 RX ORDER — AMOXICILLIN 500 MG/1
500 CAPSULE ORAL 3 TIMES DAILY
Qty: 30 CAP | Refills: 0 | Status: SHIPPED | OUTPATIENT
Start: 2020-02-29 | End: 2020-03-10

## 2020-02-29 ASSESSMENT — FIBROSIS 4 INDEX: FIB4 SCORE: 0.61

## 2020-02-29 NOTE — DISCHARGE INSTRUCTIONS
Return to the ER for any worsening sore throat, difficulty swallowing fluids or saliva, muffling of your voice, difficulty breathing, headache, stiff neck, rash, worsening cough, fevers over 100.4, shaking chills, or for any concerns.    Follow-up with 1 of the primary care physicians with [facility] medical [facility] or the [facility] within the next 1 to 2 days.  Please call for appointment.

## 2020-02-29 NOTE — ED TRIAGE NOTES
"C/O a sore throat recurring, and runny nose for approximately a week.  She denies cough, or fever.  Chief Complaint   Patient presents with   • Sore Throat     /70   Pulse 88   Temp 36.3 °C (97.3 °F) (Temporal)   Resp 20   Ht 1.702 m (5' 7\")   Wt 76 kg (167 lb 8.8 oz)   SpO2 99%   BMI 26.24 kg/m²       "

## 2020-02-29 NOTE — ED NOTES
Patient refusing flu swab but consenting to mono spot test. Patient informed on wait times and expectations. Her children are given water. Pt given flu education. Patient resting in room with no complaints. Call light in reach. Bed locked and at lowest position.

## 2020-02-29 NOTE — ED PROVIDER NOTES
"ED Provider Note  CHIEF COMPLAINT  Chief Complaint   Patient presents with   • Sore Throat       HPI  Jose Antonio Wiggins is a 24 y.o. female who presents to the ER with complaint of sore throat for the last 1 week.  She states that her son was diagnosed with strep throat a week ago.  He was on antibiotics and is now better.  She states overall her throat pain is improved over the last week, but is still lingering.  Still hurts when she swallows.  No difficulty swallowing fluids or saliva.  No trouble breathing.  No fevers.  No chills.  No ear pain.  She has a slight cough.  She complains of a little bit of runny nose and congestion.  No rash.  No nausea, vomiting or diarrhea.  No myalgias.  She was diagnosed with influenza A at [facility] on January 26.  She states she recovered fully from her influenza.    REVIEW OF SYSTEMS  See HPI for further details.  Positive for sore throat, pain with swallowing, runny nose/nasal congestion, slight cough.  Negative for ear pain, fever, chills, myalgia, nausea, vomiting, diarrhea, rash, difficulty breathing, difficulty swallowing fluids or saliva.  All other systems are negative.    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   • Proteinuria 2+ with voided specimen with first visit 6/20/2012       FAMILY HISTORY  Family History   Problem Relation Age of Onset   • Diabetes Maternal Grandfather        SOCIAL HISTORY  Social History     Socioeconomic History   • Marital status: Single     Spouse name: Not on file   • Number of children: Not on file   • Years of education: Not on file   • Highest education level: Not on file   Occupational History   • Not on file   Social Needs   • Financial resource strain: Not on file   • Food insecurity     Worry: Not on file     Inability: Not " "on file   • Transportation needs     Medical: Not on file     Non-medical: Not on file   Tobacco Use   • Smoking status: Former Smoker     Packs/day: 0.25     Years: 2.00     Pack years: 0.50     Types: Cigarettes     Last attempt to quit: 10/1/2013     Years since quittin.4   • Smokeless tobacco: Never Used   • Tobacco comment: 1/2 pack a day. Pt. quit when she found out she was pregnanct.    Substance and Sexual Activity   • Alcohol use: Yes     Comment: occassional   • Drug use: No   • Sexual activity: Yes     Partners: Male     Comment: none   Lifestyle   • Physical activity     Days per week: Not on file     Minutes per session: Not on file   • Stress: Not on file   Relationships   • Social connections     Talks on phone: Not on file     Gets together: Not on file     Attends Confucianist service: Not on file     Active member of club or organization: Not on file     Attends meetings of clubs or organizations: Not on file     Relationship status: Not on file   • Intimate partner violence     Fear of current or ex partner: Not on file     Emotionally abused: Not on file     Physically abused: Not on file     Forced sexual activity: Not on file   Other Topics Concern   • Not on file   Social History Narrative   • Not on file       SURGICAL HISTORY  Past Surgical History:   Procedure Laterality Date   • OTHER CARDIAC SURGERY      as a child       CURRENT MEDICATIONS  Home Medications    **Home medications have not yet been reviewed for this encounter**         ALLERGIES  Allergies   Allergen Reactions   • Norco [Hydrocodone-Acetaminophen] Rash     rash       PHYSICAL EXAM  VITAL SIGNS: /70   Pulse 88   Temp 36.3 °C (97.3 °F) (Temporal)   Resp 20   Ht 1.702 m (5' 7\")   Wt 76 kg (167 lb 8.8 oz)   SpO2 99%   BMI 26.24 kg/m²    Constitutional: Well developed, well nourished; No acute distress; Non-toxic appearance.   HENT: Normocephalic, atraumatic; Bilateral external ears normal; TMs are clear.  " Oropharynx with moist mucous membranes; erythema in the posterior oropharynx.  Tonsils are minimally enlarged.  No exudates in the posterior oropharynx.   Eyes: PERRL, EOMI, Conjunctiva normal. No discharge.   Neck:  Supple, nontender midline; No stridor; No nuchal rigidity.   Lymphatic: Tender anterior cervical lymphadenopathy noted.  No posterior cervical adenopathy  Cardiovascular: Regular rate and rhythm without murmurs, rubs, or gallop.   Thorax & Lungs: No respiratory distress, breath sounds clear to auscultation bilaterally without wheezing, rales or rhonchi. Nontender chest wall. No crepitus or subcutaneous air  Skin: Good color; warm and dry without rash or petechia.  Musculoskeletal: Good range of motion in all major joints. No tenderness to palpation or major deformities noted.   Neurologic: Alert & oriented x 4, clear speech        COURSE & MEDICAL DECISION MAKING  Pertinent Labs & Imaging studies reviewed. (See chart for details)  Results for orders placed or performed during the hospital encounter of 02/29/20   RAPID STREP,CULT IF INDICATED   Result Value Ref Range    Significant Indicator NEG     Source THRT     Site -     Rapid Strep Screen       Negative for Group A streptococcus.  A negative result may be obtained if the specimen is  inadequate or antigen concentration is below the  sensitivity of the test. This negative test will be followed  up with a culture as requested.     MONONUCLEOSIS TEST QUAL   Result Value Ref Range    Heterophile Screen Negative Negative         Patient presents to the ER complaining of sore throat for the last 1 week.  She admits that the throat pain is actually gotten better over the course of the week, but she is concerned because her son was diagnosed with strep throat last week and she feels that since the throat pain is not completely resolved, she should be checked herself.  No difficulty swallowing fluids or saliva.  No fevers or chills.  No voice changes.  No  trouble breathing.  She is well-appearing here in the ER.  Vital signs are normal stable.  She is not septic or toxic.  She has some enlarged anterior cervical lymph node.  Her posterior pharynx is erythematous.  No exudates.  She does not describe any pain down lower in the larynx.  There is no pain with movement of the larynx.  At this time no concern for peritonsillar abscess, retropharyngeal abscess or deep space neck abscess.  She not any significant distress, and again no voice change.  No fever.  No concern for epiglottitis or tracheitis.  Rapid strep is negative.  Monospot is negative.  She had the flu a month ago.  She declined repeat flu testing.  The patient has history of an ASD repair as a child.  I think it is reasonable to cover her with amoxicillin since her child had a positive strep test last week.  She will go home with amoxicillin 500 mg for 10 days.  She has been given strict return precautions and discharge instructions for pharyngitis and she understands if she gets worse in any way she must return to the ER immediately.  Patient understands treatment plan and follow-up.      Disposition: To home with amoxicillin 500 mg 1 p.o. 3 times daily x10 days.    FINAL IMPRESSION  1. Pharyngitis, unspecified etiology          This dictation has been created using voice recognition software. The accuracy of the dictation is limited by the abilities of the software. I expect there may be some errors of grammar and possibly content. I made every attempt to manually correct the errors within my dictation. However, errors related to voice recognition software may still exist and should be interpreted within the appropriate context.    Electronically signed by: Kaity Turner M.D., 2/29/2020 10:52 AM

## 2020-03-02 LAB
S PYO SPEC QL CULT: NORMAL
SIGNIFICANT IND 70042: NORMAL
SITE SITE: NORMAL
SOURCE SOURCE: NORMAL

## 2021-01-23 ENCOUNTER — HOSPITAL ENCOUNTER (EMERGENCY)
Facility: MEDICAL CENTER | Age: 26
End: 2021-01-23
Attending: EMERGENCY MEDICINE
Payer: MEDICAID

## 2021-01-23 ENCOUNTER — APPOINTMENT (OUTPATIENT)
Dept: RADIOLOGY | Facility: MEDICAL CENTER | Age: 26
End: 2021-01-23
Attending: EMERGENCY MEDICINE
Payer: MEDICAID

## 2021-01-23 VITALS
RESPIRATION RATE: 16 BRPM | OXYGEN SATURATION: 100 % | SYSTOLIC BLOOD PRESSURE: 127 MMHG | DIASTOLIC BLOOD PRESSURE: 70 MMHG | HEIGHT: 67 IN | HEART RATE: 78 BPM | WEIGHT: 181.88 LBS | BODY MASS INDEX: 28.55 KG/M2 | TEMPERATURE: 98.6 F

## 2021-01-23 DIAGNOSIS — Z3A.01 LESS THAN 8 WEEKS GESTATION OF PREGNANCY: ICD-10-CM

## 2021-01-23 DIAGNOSIS — N30.00 ACUTE CYSTITIS WITHOUT HEMATURIA: ICD-10-CM

## 2021-01-23 LAB
ALBUMIN SERPL BCP-MCNC: 4.5 G/DL (ref 3.2–4.9)
ALBUMIN/GLOB SERPL: 1.5 G/DL
ALP SERPL-CCNC: 69 U/L (ref 30–99)
ALT SERPL-CCNC: 13 U/L (ref 2–50)
ANION GAP SERPL CALC-SCNC: 11 MMOL/L (ref 7–16)
APPEARANCE UR: CLEAR
AST SERPL-CCNC: 14 U/L (ref 12–45)
B-HCG SERPL-ACNC: ABNORMAL MIU/ML (ref 0–5)
BACTERIA #/AREA URNS HPF: ABNORMAL /HPF
BASOPHILS # BLD AUTO: 0.7 % (ref 0–1.8)
BASOPHILS # BLD: 0.06 K/UL (ref 0–0.12)
BILIRUB SERPL-MCNC: 0.2 MG/DL (ref 0.1–1.5)
BILIRUB UR QL STRIP.AUTO: NEGATIVE
BUN SERPL-MCNC: 9 MG/DL (ref 8–22)
CALCIUM SERPL-MCNC: 9.9 MG/DL (ref 8.5–10.5)
CHLORIDE SERPL-SCNC: 107 MMOL/L (ref 96–112)
CO2 SERPL-SCNC: 20 MMOL/L (ref 20–33)
COLOR UR: YELLOW
CREAT SERPL-MCNC: 0.66 MG/DL (ref 0.5–1.4)
EOSINOPHIL # BLD AUTO: 0.2 K/UL (ref 0–0.51)
EOSINOPHIL NFR BLD: 2.3 % (ref 0–6.9)
EPI CELLS #/AREA URNS HPF: ABNORMAL /HPF
ERYTHROCYTE [DISTWIDTH] IN BLOOD BY AUTOMATED COUNT: 41.2 FL (ref 35.9–50)
GLOBULIN SER CALC-MCNC: 3 G/DL (ref 1.9–3.5)
GLUCOSE SERPL-MCNC: 89 MG/DL (ref 65–99)
GLUCOSE UR STRIP.AUTO-MCNC: NEGATIVE MG/DL
HCT VFR BLD AUTO: 36.9 % (ref 37–47)
HGB BLD-MCNC: 12.1 G/DL (ref 12–16)
HYALINE CASTS #/AREA URNS LPF: ABNORMAL /LPF
IMM GRANULOCYTES # BLD AUTO: 0.02 K/UL (ref 0–0.11)
IMM GRANULOCYTES NFR BLD AUTO: 0.2 % (ref 0–0.9)
KETONES UR STRIP.AUTO-MCNC: NEGATIVE MG/DL
LEUKOCYTE ESTERASE UR QL STRIP.AUTO: ABNORMAL
LYMPHOCYTES # BLD AUTO: 3.34 K/UL (ref 1–4.8)
LYMPHOCYTES NFR BLD: 38.7 % (ref 22–41)
MCH RBC QN AUTO: 26.2 PG (ref 27–33)
MCHC RBC AUTO-ENTMCNC: 32.8 G/DL (ref 33.6–35)
MCV RBC AUTO: 79.9 FL (ref 81.4–97.8)
MICRO URNS: ABNORMAL
MONOCYTES # BLD AUTO: 0.65 K/UL (ref 0–0.85)
MONOCYTES NFR BLD AUTO: 7.5 % (ref 0–13.4)
NEUTROPHILS # BLD AUTO: 4.35 K/UL (ref 2–7.15)
NEUTROPHILS NFR BLD: 50.6 % (ref 44–72)
NITRITE UR QL STRIP.AUTO: NEGATIVE
NRBC # BLD AUTO: 0 K/UL
NRBC BLD-RTO: 0 /100 WBC
NUMBER OF RH DOSES IND 8505RD: NORMAL
PH UR STRIP.AUTO: 5 [PH] (ref 5–8)
PLATELET # BLD AUTO: 251 K/UL (ref 164–446)
PMV BLD AUTO: 9.3 FL (ref 9–12.9)
POTASSIUM SERPL-SCNC: 3.7 MMOL/L (ref 3.6–5.5)
PROT SERPL-MCNC: 7.5 G/DL (ref 6–8.2)
PROT UR QL STRIP: NEGATIVE MG/DL
RBC # BLD AUTO: 4.62 M/UL (ref 4.2–5.4)
RBC # URNS HPF: ABNORMAL /HPF
RBC UR QL AUTO: NEGATIVE
RH BLD: NORMAL
SODIUM SERPL-SCNC: 138 MMOL/L (ref 135–145)
SP GR UR STRIP.AUTO: 1.02
UROBILINOGEN UR STRIP.AUTO-MCNC: 0.2 MG/DL
WBC # BLD AUTO: 8.6 K/UL (ref 4.8–10.8)
WBC #/AREA URNS HPF: ABNORMAL /HPF

## 2021-01-23 PROCEDURE — 87086 URINE CULTURE/COLONY COUNT: CPT

## 2021-01-23 PROCEDURE — A9270 NON-COVERED ITEM OR SERVICE: HCPCS | Performed by: EMERGENCY MEDICINE

## 2021-01-23 PROCEDURE — 700102 HCHG RX REV CODE 250 W/ 637 OVERRIDE(OP): Performed by: EMERGENCY MEDICINE

## 2021-01-23 PROCEDURE — 84702 CHORIONIC GONADOTROPIN TEST: CPT

## 2021-01-23 PROCEDURE — 85025 COMPLETE CBC W/AUTO DIFF WBC: CPT

## 2021-01-23 PROCEDURE — 81001 URINALYSIS AUTO W/SCOPE: CPT

## 2021-01-23 PROCEDURE — 99284 EMERGENCY DEPT VISIT MOD MDM: CPT

## 2021-01-23 PROCEDURE — 86901 BLOOD TYPING SEROLOGIC RH(D): CPT

## 2021-01-23 PROCEDURE — 76801 OB US < 14 WKS SINGLE FETUS: CPT

## 2021-01-23 PROCEDURE — 80053 COMPREHEN METABOLIC PANEL: CPT

## 2021-01-23 RX ORDER — MULTIVIT WITH MINERALS/LUTEIN
TABLET ORAL
Status: SHIPPED | COMMUNITY
End: 2021-07-04

## 2021-01-23 RX ORDER — FOLIC ACID 1 MG/1
1 TABLET ORAL DAILY
Status: SHIPPED | COMMUNITY
End: 2021-07-04

## 2021-01-23 RX ORDER — NITROFURANTOIN 25; 75 MG/1; MG/1
100 CAPSULE ORAL 2 TIMES DAILY
Qty: 14 CAP | Refills: 0 | Status: SHIPPED | OUTPATIENT
Start: 2021-01-23 | End: 2021-01-30

## 2021-01-23 RX ORDER — METOCLOPRAMIDE 10 MG/1
10 TABLET ORAL ONCE
Status: COMPLETED | OUTPATIENT
Start: 2021-01-23 | End: 2021-01-23

## 2021-01-23 RX ORDER — METOCLOPRAMIDE 10 MG/1
10 TABLET ORAL 4 TIMES DAILY PRN
Qty: 20 TAB | Refills: 0 | Status: SHIPPED | OUTPATIENT
Start: 2021-01-23 | End: 2021-07-04

## 2021-01-23 RX ORDER — NITROFURANTOIN 25; 75 MG/1; MG/1
100 CAPSULE ORAL ONCE
Status: COMPLETED | OUTPATIENT
Start: 2021-01-23 | End: 2021-01-23

## 2021-01-23 RX ADMIN — METOCLOPRAMIDE HYDROCHLORIDE 10 MG: 10 TABLET ORAL at 17:52

## 2021-01-23 RX ADMIN — NITROFURANTOIN MONOHYDRATE AND NITROFURANTOIN MACROCRYSTALLINE 100 MG: 75; 25 CAPSULE ORAL at 19:30

## 2021-01-23 ASSESSMENT — FIBROSIS 4 INDEX: FIB4 SCORE: 0.63

## 2021-01-24 NOTE — ED TRIAGE NOTES
Jose Antonio Tj Feliciano  25 y.o.  Chief Complaint   Patient presents with   • Abdominal Cramping     lower abdomen, worsening x 2-3 days, denies vaginal bleeding/discharge   • Pregnancy     , LMP 2020, approximately 4-5 weeks per patient     Ambulatory to triage with steady gait for above. A & O 4, GCS 15. Mask in place.    States that she had intercourse 2020, found out that she was pregnant after that.    Complaining of nausea all throughout the day since finding out she is pregnant, denies vomiting.    Has been unsuccessfully trying [facility]    Triage process explained to patient, apologized for wait time, and returned to lobby.

## 2021-01-24 NOTE — DISCHARGE INSTRUCTIONS
If symptoms are out of control or you are having severe pain or heavy vaginal bleeding or severe nausea and vomiting return here at once for recheck otherwise follow-up at the pregnancy center for prenatal care

## 2021-01-24 NOTE — ED PROVIDER NOTES
"ED Provider Note    CHIEF COMPLAINT  Chief Complaint   Patient presents with   • Abdominal Cramping     lower abdomen, worsening x 2-3 days, denies vaginal bleeding/discharge   • Pregnancy     , LMP 2020, approximately 4-5 weeks per patient       RAMSES Wiggins is a 25 y.o. female who presents to the emergency department complaining of pelvic cramping and discomfort.  The patient is 4 to 5 weeks pregnant she says she has been experiencing nausea throughout most of the days recently and today her symptoms of pelvic cramping got worse so she is come to the ER for evaluation.  She is not experiencing any vaginal bleeding, pregnancy history G3, P2 and the patient says that there were no complications during her previous pregnancies.  The patient plans to go to the pregnancy center for prenatal care.    REVIEW OF SYSTEMS no fever chills cough or respiratory symptoms no vaginal bleeding no back pain no upper abdominal pain.  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   • Proteinuria 2+ with voided specimen with first visit 2012       FAMILY HISTORY  Family History   Problem Relation Age of Onset   • Diabetes Maternal Grandfather        SOCIAL HISTORY  Social History     Socioeconomic History   • Marital status: Single     Spouse name: Not on file   • Number of children: Not on file   • Years of education: Not on file   • Highest education level: Not on file   Occupational History   • Not on file   Social Needs   • Financial resource strain: Not on file   • Food insecurity     Worry: Not on file     Inability: Not on file   • Transportation needs     Medical: Not on file     Non-medical: Not on file   Tobacco Use   • Smoking status: Former Smoker     Packs/day: 0.25 " "    Years: 2.00     Pack years: 0.50     Types: Cigarettes     Quit date: 10/1/2013     Years since quittin.3   • Smokeless tobacco: Never Used   • Tobacco comment: 1/2 pack a day. Pt. quit when she found out she was pregnanct.    Substance and Sexual Activity   • Alcohol use: Not Currently   • Drug use: No   • Sexual activity: Yes     Partners: Male     Comment: none   Lifestyle   • Physical activity     Days per week: Not on file     Minutes per session: Not on file   • Stress: Not on file   Relationships   • Social connections     Talks on phone: Not on file     Gets together: Not on file     Attends Shinto service: Not on file     Active member of club or organization: Not on file     Attends meetings of clubs or organizations: Not on file     Relationship status: Not on file   • Intimate partner violence     Fear of current or ex partner: Not on file     Emotionally abused: Not on file     Physically abused: Not on file     Forced sexual activity: Not on file   Other Topics Concern   • Not on file   Social History Narrative   • Not on file       SURGICAL HISTORY  Past Surgical History:   Procedure Laterality Date   • OTHER CARDIAC SURGERY      as a child       CURRENT MEDICATIONS  Home Medications     Reviewed by Shannan Lemus R.N. (Registered Nurse) on 21 at 1711  Med List Status: Partial   Medication Last Dose Status   Ascorbic Acid (VITAMIN C) 1000 MG Tab  Active   folic acid (FOLVITE) 1 MG Tab  Active   Multiple Vitamins-Minerals (HAIR SKIN AND NAILS FORMULA PO)  Active   Phenylephrine-DM-GG-APAP (TYLENOL COLD/FLU SEVERE PO) not taking Active                ALLERGIES  Allergies   Allergen Reactions   • Norco [Hydrocodone-Acetaminophen] Rash     rash       PHYSICAL EXAM  VITAL SIGNS: /70   Pulse 78   Temp 37 °C (98.6 °F) (Temporal)   Resp 16   Ht 1.702 m (5' 7\")   Wt 82.5 kg (181 lb 14.1 oz)   LMP 2020   SpO2 100%   BMI 28.49 kg/m²    Oxygen saturation is interpreted as " adequate  Constitutional: Awake verbal very pleasant individual in no distress  Eyes: No erythema discharge or jaundice  Neck: Trachea midline no JVD  Cardiovascular: Regular rate and rhythm  Lungs: Clear and equal bilaterally with no apparent difficulty breathing  Abdomen/Back: Soft and nondistended, no peritoneal findings no CVA tenderness with percussion  Skin: Warm and dry  Musculoskeletal: No acute bony deformity  Neurologic: Wake verbal ambulatory moving all extremities with no difficulty    Laboratory  CBC shows white blood cell count of 8.6 hemoglobin 12.1 basic metabolic panel is unremarkable LFTs are unremarkable Rh is positive quantitative beta hCG value was 32,938.  Urinalysis today is trace positive for leukocyte Estrace with 2-5 white blood cells and a few bacteria seen in the microscopic exam.  I ordered and add on urine culture.    Radiology  US-OB 1ST TRIMESTER WITH TRANSVAGINAL (COMBO)   Final Result      1.  Single living intrauterine gestation of an estimated menstrual age 6 weeks 3 days. Ultrasound GABRIEL 9/15/2021. Clinical GABRIEL 9/13/2021.      2.  Small amount of left-sided subchorionic hemorrhage identified measuring 14.3 x 12.2 x 4.5 mm.      3.  Normal appearance of each ovary.      4.  No adnexal mass or free fluid.        MEDICAL DECISION MAKING and DISPOSITION  In the emergency department the patient generally looks well I reviewed all the findings with her she was given oral Reglan and she does feel that this was helpful at controlling her nausea and she does not have any apparent adverse reaction to this medication.  The patient was started on Macrobid for concern about possible urinary tract infection.  At this point in time I think it is safe for the patient to go home I written prescriptions for Reglan and Macrobid and I have advised the patient to call the pregnancy center in the morning on Monday and arrange prenatal care and office follow-up as soon as possible.  In addition she  understands that if she is experiencing new or worsening symptoms, she is unable to tolerate oral intake due to nausea vomiting or pain is out of control or she has heavy vaginal bleeding and she is to return here for recheck    IMPRESSION  1.  Pregnant patient, 6 weeks three days by ultrasound  2.  Small subchorionic hemorrhage on ultrasound  3.  Urinary tract infection         Electronically signed by: Ezra Martin M.D., 1/23/2021 7:54 PM

## 2021-01-26 LAB
BACTERIA UR CULT: NORMAL
SIGNIFICANT IND 70042: NORMAL
SITE SITE: NORMAL
SOURCE SOURCE: NORMAL

## 2021-02-02 ENCOUNTER — HOSPITAL ENCOUNTER (EMERGENCY)
Facility: MEDICAL CENTER | Age: 26
End: 2021-02-02
Attending: EMERGENCY MEDICINE
Payer: MEDICAID

## 2021-02-02 ENCOUNTER — APPOINTMENT (OUTPATIENT)
Dept: RADIOLOGY | Facility: MEDICAL CENTER | Age: 26
End: 2021-02-02
Attending: EMERGENCY MEDICINE
Payer: MEDICAID

## 2021-02-02 VITALS
BODY MASS INDEX: 28.89 KG/M2 | SYSTOLIC BLOOD PRESSURE: 118 MMHG | DIASTOLIC BLOOD PRESSURE: 70 MMHG | OXYGEN SATURATION: 100 % | RESPIRATION RATE: 15 BRPM | WEIGHT: 184.08 LBS | HEART RATE: 86 BPM | TEMPERATURE: 98.1 F | HEIGHT: 67 IN

## 2021-02-02 DIAGNOSIS — O20.0 THREATENED MISCARRIAGE: ICD-10-CM

## 2021-02-02 LAB
APPEARANCE UR: CLEAR
B-HCG SERPL-ACNC: ABNORMAL MIU/ML (ref 0–5)
BASOPHILS # BLD AUTO: 0.6 % (ref 0–1.8)
BASOPHILS # BLD: 0.04 K/UL (ref 0–0.12)
BILIRUB UR QL STRIP.AUTO: NEGATIVE
COLOR UR: YELLOW
EOSINOPHIL # BLD AUTO: 0.13 K/UL (ref 0–0.51)
EOSINOPHIL NFR BLD: 1.9 % (ref 0–6.9)
ERYTHROCYTE [DISTWIDTH] IN BLOOD BY AUTOMATED COUNT: 41.3 FL (ref 35.9–50)
GLUCOSE UR STRIP.AUTO-MCNC: NEGATIVE MG/DL
HCT VFR BLD AUTO: 36.1 % (ref 37–47)
HGB BLD-MCNC: 12 G/DL (ref 12–16)
IMM GRANULOCYTES # BLD AUTO: 0.02 K/UL (ref 0–0.11)
IMM GRANULOCYTES NFR BLD AUTO: 0.3 % (ref 0–0.9)
KETONES UR STRIP.AUTO-MCNC: NEGATIVE MG/DL
LEUKOCYTE ESTERASE UR QL STRIP.AUTO: NEGATIVE
LYMPHOCYTES # BLD AUTO: 2.15 K/UL (ref 1–4.8)
LYMPHOCYTES NFR BLD: 32 % (ref 22–41)
MCH RBC QN AUTO: 26.5 PG (ref 27–33)
MCHC RBC AUTO-ENTMCNC: 33.2 G/DL (ref 33.6–35)
MCV RBC AUTO: 79.9 FL (ref 81.4–97.8)
MICRO URNS: NORMAL
MONOCYTES # BLD AUTO: 0.53 K/UL (ref 0–0.85)
MONOCYTES NFR BLD AUTO: 7.9 % (ref 0–13.4)
NEUTROPHILS # BLD AUTO: 3.85 K/UL (ref 2–7.15)
NEUTROPHILS NFR BLD: 57.3 % (ref 44–72)
NITRITE UR QL STRIP.AUTO: NEGATIVE
NRBC # BLD AUTO: 0 K/UL
NRBC BLD-RTO: 0 /100 WBC
PH UR STRIP.AUTO: 7.5 [PH] (ref 5–8)
PLATELET # BLD AUTO: 244 K/UL (ref 164–446)
PMV BLD AUTO: 9.1 FL (ref 9–12.9)
PROT UR QL STRIP: NEGATIVE MG/DL
RBC # BLD AUTO: 4.52 M/UL (ref 4.2–5.4)
RBC UR QL AUTO: NEGATIVE
SP GR UR STRIP.AUTO: 1.02
UROBILINOGEN UR STRIP.AUTO-MCNC: 0.2 MG/DL
WBC # BLD AUTO: 6.7 K/UL (ref 4.8–10.8)

## 2021-02-02 PROCEDURE — 81003 URINALYSIS AUTO W/O SCOPE: CPT

## 2021-02-02 PROCEDURE — 36415 COLL VENOUS BLD VENIPUNCTURE: CPT

## 2021-02-02 PROCEDURE — 84702 CHORIONIC GONADOTROPIN TEST: CPT

## 2021-02-02 PROCEDURE — 99284 EMERGENCY DEPT VISIT MOD MDM: CPT

## 2021-02-02 PROCEDURE — 76801 OB US < 14 WKS SINGLE FETUS: CPT

## 2021-02-02 PROCEDURE — 85025 COMPLETE CBC W/AUTO DIFF WBC: CPT

## 2021-02-02 ASSESSMENT — FIBROSIS 4 INDEX: FIB4 SCORE: 0.42

## 2021-02-02 NOTE — ED NOTES
ERP re-eval complete. Pt states understanding of dc instructions and f/u care. Pt able to amb w/ steady gait.

## 2021-02-02 NOTE — ED TRIAGE NOTES
"Chief Complaint   Patient presents with   • Vaginal Bleeding     Pt is 8 weeks pregnant, pink and 'a couple blood spots' while wiping after urinating, cramping starting this AM as well.    • Low Back Pain     Lower right back pain X 5 days, associated with cramping, denies fevers, denies urinary changes     Pt arrives by self to triage for above complaints. Seen in this ER for cramping on 1/23, discharged home, was not having any vaginal bleeding at that time. VSS on RA, GCS 15, NAD.    [facility] on [facility] process and to inform staff of any changes.     Denies all s/sx of covid, denies recent travel, denies fevers.    /59   Pulse 83   Temp 36.3 °C (97.3 °F) (Temporal)   Resp 16   Ht 1.702 m (5' 7\")   Wt 83.5 kg (184 lb 1.4 oz)   LMP 12/07/2020   SpO2 99%   BMI 28.83 kg/m²      "

## 2021-02-02 NOTE — ED PROVIDER NOTES
"ED Provider Note    CHIEF COMPLAINT  Chief Complaint   Patient presents with   • Vaginal Bleeding     Pt is 8 weeks pregnant, pink and 'a couple blood spots' while wiping after urinating, cramping starting this AM as well.    • Low Back Pain     Lower right back pain X 5 days, associated with cramping, denies fevers, denies urinary changes       HPI  Jose Antonio Wiggins is a 25 y.o. female who presents with a past medical history significant for anemia, she is in her fourth pregnancy she is -0-1-0 with 1 , she had a last menstrual period of , she had an IUP diagnosed on ultrasound at our facility with subchorionic hemorrhage.  The patient comes in today with spotting that began this morning.  She denies passing any clots.  She has had some back cramping and no urinary symptoms.    REVIEW OF SYSTEMS  See HPI for further details. All other systems are negative.     PAST MEDICAL HISTORY   has a past medical history of Anemia (10/16/2012), Aortic septal defect (Diagnosed 3/2007), H/O heart surgery \"ASD\" Has implant card in media  (2012), Heart murmur, and Proteinuria 2+ with voided specimen with first visit (2012).    SOCIAL HISTORY  Social History     Tobacco Use   • Smoking status: Former Smoker     Packs/day: 0.25     Years: 2.00     Pack years: 0.50     Types: Cigarettes     Quit date: 10/1/2013     Years since quittin.3   • Smokeless tobacco: Never Used   • Tobacco comment: 1/2 pack a day. Pt. quit when she found out she was pregnanct.    Substance and Sexual Activity   • Alcohol use: Not Currently   • Drug use: No   • Sexual activity: Yes     Partners: Male     Comment: none       SURGICAL HISTORY   has a past surgical history that includes other cardiac surgery.    CURRENT MEDICATIONS  Home Medications     Reviewed by Joey Honeycutt R.N. (Registered Nurse) on 21 at 0921  Med List Status: Partial   Medication Last Dose Status   Ascorbic Acid (VITAMIN C) 1000 MG " "Tab  Active   folic acid (FOLVITE) 1 MG Tab  Active   metoclopramide (REGLAN) 10 MG Tab  Active   Multiple Vitamins-Minerals (HAIR SKIN AND NAILS FORMULA PO)  Active   Phenylephrine-DM-GG-APAP (TYLENOL COLD/FLU SEVERE PO)  Active                ALLERGIES  Allergies   Allergen Reactions   • Norco [Hydrocodone-Acetaminophen] Rash     rash       PHYSICAL EXAM  VITAL SIGNS: /70   Pulse 86   Temp 36.3 °C (97.3 °F) (Temporal)   Resp 15   Ht 1.702 m (5' 7\")   Wt 83.5 kg (184 lb 1.4 oz)   LMP 12/07/2020   SpO2 100%   BMI 28.83 kg/m²  @BETTY[[id number]::@   Pulse ox interpretation: I interpret this pulse ox as normal.  Constitutional: Alert in no apparent distress.  HENT: No signs of trauma, Bilateral external ears normal, Nose normal.   Eyes: Pupils are equal and reactive, Conjunctiva normal, Non-icteric.   Neck: Normal range of motion, No tenderness, Supple, No stridor.   Lymphatic: No lymphadenopathy noted.   Cardiovascular: Regular rate and rhythm, no murmurs.   Thorax & Lungs: Normal breath sounds, No respiratory distress, No wheezing, No chest tenderness.   Abdomen: Bowel sounds normal, Soft, No tenderness, No masses, No pulsatile masses. No peritoneal signs.  Skin: Warm, Dry, No erythema, No rash.   Back: No bony tenderness, No CVA tenderness.   Extremities: Intact distal pulses, No edema, No tenderness, No cyanosis.  Musculoskeletal: Good range of motion in all major joints. No tenderness to palpation or major deformities noted.   Neurologic: Alert , Normal motor function, Normal sensory function, No focal deficits noted.   Psychiatric: Affect normal, Judgment normal, Mood normal.       DIAGNOSTIC STUDIES / PROCEDURES      LABS  Labs Reviewed   CBC WITH DIFFERENTIAL - Abnormal; Notable for the following components:       Result Value    Hematocrit 36.1 (*)     MCV 79.9 (*)     MCH 26.5 (*)     MCHC 33.2 (*)     All other components within normal limits   HCG QUANTITATIVE   URINALYSIS,CULTURE IF INDICATED    " Narrative:     Indication for culture:->Pregnant women: fever and/or  asymptomatic screening         RADIOLOGY  US-OB 1ST TRIMESTER WITH TRANSVAGINAL (COMBO)   Final Result      Viable single intrauterine gestation of an estimated menstrual age of 8 weeks 1 day.              COURSE & MEDICAL DECISION MAKING  Pertinent Labs & Imaging studies reviewed. (See chart for details)    Differential Diagnosis: Threatened miscarriage, spontaneous miscarriage    I reviewed the patient's previous records, she has had more than 1 Rh+ blood results, she does not require RhoGam.  Her H&H here is stable, her ultrasound shows 8-week IUP.  The patient's bleeding has stopped, she will observe pelvic rest.  She will follow-up with the [facility] pregnancy center and return to the ER with worsening symptoms.  She will observe pelvic rest, she is told nothing in vagina.    The patient will return for new or worsening symptoms and is stable at the time of discharge.    The patient is referred to a primary physician for blood pressure management, diabetic screening, and for all other preventative health concerns.        DISPOSITION:  Patient will be discharged home in stable condition.    FOLLOW UP:  [facility], Emergency Dept  [address] [address] [facility] [address] [phone]    If symptoms worsen    Pregnancy Fab Falcon M.D.  [address] [address]  [phone]      call for follow up      OUTPATIENT MEDICATIONS:  New Prescriptions    No medications on file          The patient will return for worsening symptoms and is stable at the time of discharge. The patient verbalizes understanding and will comply.    FINAL IMPRESSION  1. Threatened miscarriage                Electronically signed by: James Strickland M.D., 2/2/2021 10:06 AM

## 2021-02-02 NOTE — DISCHARGE INSTRUCTIONS
Threatened Miscarriage    A threatened miscarriage occurs when a woman has vaginal bleeding during the first 20 weeks of pregnancy but the pregnancy has not ended. If you have vaginal bleeding during this time, your health care provider will do tests to make sure you are still pregnant. If the tests show that you are still pregnant and that the developing baby (fetus) inside your uterus is still growing, your condition is considered a threatened miscarriage.  A threatened miscarriage does not mean your pregnancy will end, but it does increase the risk of losing your pregnancy (complete miscarriage).  What are the causes?  The cause of this condition is usually not known. For women who go on to have a complete miscarriage, the most common cause is an abnormal number of chromosomes in the developing baby. Chromosomes are the structures inside cells that hold all of a person's genetic material.  What increases the risk?  The following lifestyle factors may increase your risk of a miscarriage in early pregnancy:  · Smoking.  · Drinking excessive amounts of alcohol or caffeine.  · Recreational drug use.  The following preexisting health conditions may increase your risk of a miscarriage in early pregnancy:  · Polycystic ovary syndrome.  · Uterine fibroids.  · Infections.  · Diabetes mellitus.  What are the signs or symptoms?  Symptoms of this condition include:  · Vaginal bleeding.  · Mild abdominal pain or cramps.  How is this diagnosed?  If you have bleeding with or without abdominal pain before 20 weeks of pregnancy, your health care provider will do tests to check whether you are still pregnant. These will include:  · Ultrasound. This test uses sound waves to create images of the inside of your uterus. This allows your health care provider to look at your developing baby and other structures, such as your placenta.  · Pelvic exam. This is an internal exam of your vagina and cervix.  · Measurement of your baby's heart  rate.  · Laboratory tests such as blood tests, urine tests, or swabs for infection  You may be diagnosed with a threatened miscarriage if:  · Ultrasound testing shows that you are still pregnant.  · Your baby’s heart rate is strong.  · A pelvic exam shows that the opening between your uterus and your vagina (cervix) is closed.  · Blood tests confirm that you are still pregnant.  How is this treated?  No treatments have been shown to prevent a threatened miscarriage from going on to a complete miscarriage. However, the right home care is important.  Follow these instructions at home:  · Get plenty of rest.  · Do not have sex or use tampons if you have vaginal bleeding.  · Do not douche.  · Do not smoke or use recreational drugs.  · Do not drink alcohol.  · Avoid caffeine.  · Keep all follow-up prenatal visits as told by your health care provider. This is important.  Contact a health care provider if:  · You have light vaginal bleeding or spotting while pregnant.  · You have abdominal pain or cramping.  · You have a fever.  Get help right away if:  · You have heavy vaginal bleeding.  · You have blood clots coming from your vagina.  · You pass tissue from your vagina.  · You leak fluid, or you have a gush of fluid from your vagina.  · You have severe low back pain or abdominal cramps.  · You have fever, chills, and severe abdominal pain.  Summary  · A threatened miscarriage occurs when a woman has vaginal bleeding during the first 20 weeks of pregnancy but the pregnancy has not ended.  · The cause of a threatened miscarriage is usually not known.  · Symptoms of this condition may include vaginal bleeding and mild abdominal pain or cramps.  · No treatments have been shown to prevent a threatened miscarriage from going on to a complete miscarriage.  · Keep all follow-up prenatal visits as told by your health care provider. This is important.  This information is not intended to replace advice given to you by your health  care provider. Make sure you discuss any questions you have with your health care provider.  Document Released: 12/18/2006 Document Revised: 01/24/2019 Document Reviewed: 03/16/2018  Elsevier Patient Education © 2020 [employer]

## 2021-02-02 NOTE — Clinical Note
Jose Antonio Wiggins was seen and treated in our emergency department on 2/2/2021.  She may return to work on 02/03/2021.       If you have any questions or concerns, please don't hesitate to call.      James Strickland M.D.

## 2021-03-24 ENCOUNTER — HOSPITAL ENCOUNTER (EMERGENCY)
Facility: MEDICAL CENTER | Age: 26
End: 2021-03-24
Attending: EMERGENCY MEDICINE
Payer: MEDICAID

## 2021-03-24 VITALS
HEART RATE: 68 BPM | RESPIRATION RATE: 18 BRPM | TEMPERATURE: 97.9 F | SYSTOLIC BLOOD PRESSURE: 140 MMHG | OXYGEN SATURATION: 99 % | WEIGHT: 178.79 LBS | BODY MASS INDEX: 28.06 KG/M2 | DIASTOLIC BLOOD PRESSURE: 99 MMHG | HEIGHT: 67 IN

## 2021-03-24 DIAGNOSIS — U07.1 COVID-19: ICD-10-CM

## 2021-03-24 LAB
FLUAV AG SPEC QL IA: NEGATIVE
FLUBV AG SPEC QL IA: NEGATIVE
S PYO DNA SPEC NAA+PROBE: NOT DETECTED
SARS-COV+SARS-COV-2 AG RESP QL IA.RAPID: DETECTED
SARS-COV-2 RNA RESP QL NAA+PROBE: DETECTED
SPECIMEN SOURCE: ABNORMAL
SPECIMEN SOURCE: ABNORMAL

## 2021-03-24 PROCEDURE — 87426 SARSCOV CORONAVIRUS AG IA: CPT

## 2021-03-24 PROCEDURE — A9270 NON-COVERED ITEM OR SERVICE: HCPCS | Performed by: EMERGENCY MEDICINE

## 2021-03-24 PROCEDURE — 99283 EMERGENCY DEPT VISIT LOW MDM: CPT

## 2021-03-24 PROCEDURE — C9803 HOPD COVID-19 SPEC COLLECT: HCPCS | Performed by: EMERGENCY MEDICINE

## 2021-03-24 PROCEDURE — 87400 INFLUENZA A/B EACH AG IA: CPT | Mod: 91

## 2021-03-24 PROCEDURE — U0005 INFEC AGEN DETEC AMPLI PROBE: HCPCS

## 2021-03-24 PROCEDURE — 87651 STREP A DNA AMP PROBE: CPT

## 2021-03-24 PROCEDURE — U0003 INFECTIOUS AGENT DETECTION BY NUCLEIC ACID (DNA OR RNA); SEVERE ACUTE RESPIRATORY SYNDROME CORONAVIRUS 2 (SARS-COV-2) (CORONAVIRUS DISEASE [COVID-19]), AMPLIFIED PROBE TECHNIQUE, MAKING USE OF HIGH THROUGHPUT TECHNOLOGIES AS DESCRIBED BY CMS-2020-01-R: HCPCS

## 2021-03-24 PROCEDURE — 700102 HCHG RX REV CODE 250 W/ 637 OVERRIDE(OP): Performed by: EMERGENCY MEDICINE

## 2021-03-24 RX ORDER — ACETAMINOPHEN 500 MG
1000 TABLET ORAL ONCE
Status: DISCONTINUED | OUTPATIENT
Start: 2021-03-24 | End: 2021-03-24

## 2021-03-24 RX ORDER — IBUPROFEN 600 MG/1
600 TABLET ORAL ONCE
Status: COMPLETED | OUTPATIENT
Start: 2021-03-24 | End: 2021-03-24

## 2021-03-24 RX ORDER — ONDANSETRON 4 MG/1
4 TABLET, ORALLY DISINTEGRATING ORAL ONCE
Status: DISCONTINUED | OUTPATIENT
Start: 2021-03-24 | End: 2021-03-24 | Stop reason: HOSPADM

## 2021-03-24 RX ADMIN — IBUPROFEN 600 MG: 600 TABLET, FILM COATED ORAL at 08:46

## 2021-03-24 ASSESSMENT — FIBROSIS 4 INDEX: FIB4 SCORE: 0.43

## 2021-03-24 NOTE — ED NOTES
Pt to room. Introduced self as pt RN. Labs collected and sent. Pt medicated per MAR. Pt denies any further needs at this time.

## 2021-03-24 NOTE — ED PROVIDER NOTES
"ED Provider Note    CHIEF COMPLAINT  Chief Complaint   Patient presents with   • Sore Throat     yesterday   • Headache     sensitivity to light   • Nausea   • Body Aches       HPI  Jose Antonio Wiggins is a 25 y.o. female here for evaluation of sore throat headache and nausea.  Patient states that this is been ongoing for the last couple of days.  She states that her son has strep throat, and she has been around him.  Patient has no chest pain, shortness of breath, or vomiting.  She reports some generalized body aches, mild headache and some mild photosensitivity.  Nothing seems to leave exacerbate her symptoms.  She took some over-the-counter medicine for the discomfort last night with some relief.    ROS;  Please see HPI  O/W negative     PAST MEDICAL HISTORY   has a past medical history of Anemia (10/16/2012), Aortic septal defect (Diagnosed 3/2007), H/O heart surgery \"ASD\" Has implant card in media  (2012), Heart murmur, and Proteinuria 2+ with voided specimen with first visit (2012).    SOCIAL HISTORY  Social History     Tobacco Use   • Smoking status: Former Smoker     Packs/day: 0.25     Years: 2.00     Pack years: 0.50     Types: Cigarettes     Quit date: 10/1/2013     Years since quittin.4   • Smokeless tobacco: Never Used   • Tobacco comment: 1/2 pack a day. Pt. quit when she found out she was pregnanct.    Substance and Sexual Activity   • Alcohol use: Not Currently   • Drug use: No   • Sexual activity: Yes     Partners: Male     Comment: none       SURGICAL HISTORY   has a past surgical history that includes other cardiac surgery.    CURRENT MEDICATIONS  Home Medications     Reviewed by Renée Reynoso R.N. (Registered Nurse) on 21 at 0740  Med List Status: Partial   Medication Last Dose Status   Ascorbic Acid (VITAMIN C) 1000 MG Tab  Active   folic acid (FOLVITE) 1 MG Tab  Active   metoclopramide (REGLAN) 10 MG Tab  Active   Multiple Vitamins-Minerals (HAIR SKIN AND NAILS " "FORMULA PO)  Active   Phenylephrine-DM-GG-APAP (TYLENOL COLD/FLU SEVERE PO)  Active                ALLERGIES  Allergies   Allergen Reactions   • Norco [Hydrocodone-Acetaminophen] Rash     rash       REVIEW OF SYSTEMS  See HPI for further details. Review of systems as above, otherwise all other systems are negative.     PHYSICAL EXAM  VITAL SIGNS: /85   Pulse 79   Temp 36.6 °C (97.9 °F) (Temporal)   Resp 16   Ht 1.702 m (5' 7\")   Wt 81.1 kg (178 lb 12.7 oz)   LMP 12/07/2020   SpO2 97%   BMI 28.00 kg/m²     Constitutional: Well developed, well nourished. No acute distress.  HEENT: Normocephalic, atraumatic. MMM, posterior pharynx with PND  Neck: Supple, Full range of motion, no meningeal signs  Chest/Pulmonary:  No respiratory distress.  Equal expansion   Musculoskeletal: No deformity, no edema, neurovascular intact.   Neuro: Clear speech, appropriate, cooperative, cranial nerves II-XII grossly intact.  Psych: Normal mood and affect      Results for orders placed or performed during the hospital encounter of 03/24/21   CoV, Flu A/B RAPID ANTIGEN: Collect one dry nasal swab AND NP swab in VTM    Specimen: Nasopharyngeal; Respirate   Result Value Ref Range    Influenza A AG by CYNDI Negative Negative    Influenza B AG by CYNDI Negative Negative    SARS-COV ANTIGEN CYNDI DETECTED (AA) Not-Detected    SARS-CoV-2 Source Nasal Swab    SARS-CoV-2 PCR (24 hour In-House): Collect NP swab in VTM    Specimen: Respirate   Result Value Ref Range    SARS-CoV-2 Source NP Swab    Group A Strep by PCR    Specimen: Throat   Result Value Ref Range    Group A Strep by PCR Not Detected Not Detected           PROCEDURES     MEDICAL RECORD  I have reviewed patient's medical record and pertinent results are listed.    COURSE & MEDICAL DECISION MAKING  I have reviewed any medical record information, laboratory studies and radiographic results as noted above.    Pt was pregnant on last visit, in early feb, however she states she is no " longer pregnant, so motrin will be given.    11:26 AM  Pt is nontoxic appearing, comfortable, and afebrile. She will follow up or return here for further issues or concerns.      I you have had any blood pressure issues while here in the emergency department, please see your doctor for a further evaluation or work up.    Differential diagnoses include but not limited to: strep, covid, uri    This patient presents with Covid .  At this time, I have counseled the patient/family regarding their medications, pain control, and follow up.  They will continue their medications, if any, as prescribed.  They will return immediately for any worsening symptoms and/or any other medical concerns.  They will see their doctor, or contact the doctor provided, in 1-2 days for follow up.       FINAL IMPRESSION  1. COVID-19             Electronically signed by: Torres Pollack D.O., 3/24/2021 8:30 AM

## 2021-03-24 NOTE — ED NOTES
Reviewed discharge instructions with pt. Verbalized understanding. Pt ambulatory out of ER with steady gait and belongings.

## 2021-03-24 NOTE — Clinical Note
Jose Antonio Wiggins was seen and treated in our emergency department on 3/24/2021.  She may return to work on 04/06/2021.       If you have any questions or concerns, please don't hesitate to call.      Torres Pollack D.O.

## 2021-03-24 NOTE — ED NOTES
Lab called earlier and reported pt needed different swab. Was to tube swab but system has been down.   This RN went to micro and got the swab. Swab now collected and sent.   Repeat VS collected. Pt denies needs. Will cont to monitor.

## 2021-03-24 NOTE — ED NOTES
Lab called with critical result of +covid at 0908. Critical lab result read back to lab.   Dr. Mobley notified of critical lab result at 0908.  Critical lab result read back by Dr. Mobley.

## 2021-03-24 NOTE — ED TRIAGE NOTES
Chief Complaint   Patient presents with   • Sore Throat     yesterday   • Headache     sensitivity to light   • Nausea   • Body Aches     Pt ambulated to triage with above complaints. Nad. Pt reports that her kids has viral respiratory infection and strep.

## 2021-07-04 ENCOUNTER — OFFICE VISIT (OUTPATIENT)
Dept: URGENT CARE | Facility: CLINIC | Age: 26
End: 2021-07-04
Payer: MEDICAID

## 2021-07-04 VITALS
BODY MASS INDEX: 26.51 KG/M2 | HEART RATE: 99 BPM | WEIGHT: 168.9 LBS | OXYGEN SATURATION: 99 % | SYSTOLIC BLOOD PRESSURE: 100 MMHG | DIASTOLIC BLOOD PRESSURE: 60 MMHG | HEIGHT: 67 IN | TEMPERATURE: 97.3 F | RESPIRATION RATE: 16 BRPM

## 2021-07-04 DIAGNOSIS — Z20.818 STREP THROAT EXPOSURE: ICD-10-CM

## 2021-07-04 DIAGNOSIS — J02.9 SORE THROAT: ICD-10-CM

## 2021-07-04 DIAGNOSIS — J98.8 RTI (RESPIRATORY TRACT INFECTION): ICD-10-CM

## 2021-07-04 LAB
INT CON NEG: NEGATIVE
INT CON POS: POSITIVE
S PYO AG THROAT QL: NEGATIVE

## 2021-07-04 PROCEDURE — 99203 OFFICE O/P NEW LOW 30 MIN: CPT | Performed by: FAMILY MEDICINE

## 2021-07-04 PROCEDURE — 87880 STREP A ASSAY W/OPTIC: CPT | Mod: QW | Performed by: FAMILY MEDICINE

## 2021-07-04 RX ORDER — DEXTROMETHORPHAN HYDROBROMIDE AND PROMETHAZINE HYDROCHLORIDE 15; 6.25 MG/5ML; MG/5ML
5 SYRUP ORAL EVERY 6 HOURS PRN
Qty: 120 ML | Refills: 0 | Status: SHIPPED | OUTPATIENT
Start: 2021-07-04

## 2021-07-04 RX ORDER — AZITHROMYCIN 250 MG/1
TABLET, FILM COATED ORAL
Qty: 6 TABLET | Refills: 0 | Status: SHIPPED | OUTPATIENT
Start: 2021-07-04

## 2021-07-04 ASSESSMENT — ENCOUNTER SYMPTOMS
COUGH: 1
SORE THROAT: 1

## 2021-07-04 ASSESSMENT — FIBROSIS 4 INDEX: FIB4 SCORE: 0.45

## 2021-07-04 NOTE — PROGRESS NOTES
"Subjective:      Jose Antonio Wiggins is a 26 y.o. female who presents with Pharyngitis (runny nose, green mucus. (Son pos Strep) x 2 days. )      - This is a pleasant and nontoxic appearing 26 y.o. female with c/o sore throat x 2-3 days, a little cough and stuffy nose at times. No NVFC. Feels well otherwise. 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   • Proteinuria 2+ with voided specimen with first visit 6/20/2012        PSH:  Past Surgical History:   Procedure Laterality Date   • OTHER CARDIAC SURGERY      as a child       MEDS:    Current Outpatient Medications:   •  azithromycin (ZITHROMAX) 250 MG Tab, Use as directed, Disp: 6 tablet, Rfl: 0  •  promethazine-dextromethorphan (PROMETHAZINE-DM) 6.25-15 MG/5ML syrup, Take 5 mL by mouth every 6 hours as needed for Cough., Disp: 120 mL, Rfl: 0    ** I have documented what I find to be significant in regards to past medical, social, family and surgical history  in my HPI or under PMH/PSH/FH review section, otherwise it is noncontributory **     HPI    Review of Systems   HENT: Positive for congestion and sore throat.    Respiratory: Positive for cough.    All other systems reviewed and are negative.         Objective:     /60   Pulse 99   Temp 36.3 °C (97.3 °F)   Resp 16   Ht 1.702 m (5' 7\")   Wt 76.6 kg (168 lb 14.4 oz)   LMP 12/07/2020   SpO2 99%   BMI 26.45 kg/m²      Physical Exam  Vitals and nursing note reviewed.   Constitutional:       General: She is not in acute distress.     Appearance: Normal appearance. She is well-developed.   HENT:      Head: Normocephalic and atraumatic.      Mouth/Throat:      Mouth: Mucous membranes are moist.      Pharynx: Oropharynx is clear. " Posterior oropharyngeal erythema present. No oropharyngeal exudate.   Eyes:      General: No scleral icterus.  Cardiovascular:      Heart sounds: Normal heart sounds. No murmur heard.     Pulmonary:      Effort: Pulmonary effort is normal. No respiratory distress.      Breath sounds: Normal breath sounds.   Skin:     Coloration: Skin is not jaundiced or pale.   Neurological:      Mental Status: She is alert.      Motor: No abnormal muscle tone.   Psychiatric:         Mood and Affect: Mood normal.         Behavior: Behavior normal.       Assessment/Plan:          1. Sore throat  POCT Rapid Strep A    azithromycin (ZITHROMAX) 250 MG Tab   2. RTI (respiratory tract infection)  azithromycin (ZITHROMAX) 250 MG Tab    promethazine-dextromethorphan (PROMETHAZINE-DM) 6.25-15 MG/5ML syrup   3. Strep throat exposure  azithromycin (ZITHROMAX) 250 MG Tab       - Dx, plan & d/c instructions discussed w/ patient  - Rest, stay hydrated OTC Motrin and/or Tylenol as needed  - E.R. precautions discussed     Asked to kindly follow up with their PCP's office in 2-3 days for a recheck, ER if not improving or feeling/getting worse.    Any realistic side effects of medications that may have been given today reviewed.     Patient left in stable condition     Please note that this dictation was created using voice recognition software. I have made every reasonable attempt to correct obvious errors, but I expect that there are errors of grammar and possibly content that I did not discover before finalizing the note.

## 2021-07-04 NOTE — LETTER
July 4, 2021         Patient: Jose Antonio Wiggins   YOB: 1995   Date of Visit: 7/4/2021           To Whom it May Concern:    Jose Antonio Wiggins was seen in my clinic on 7/4/2021. She may return to work in 1-3 days.    If you have any questions or concerns, please don't hesitate to call.        Sincerely,           Kush Briceño M.D.  Electronically Signed

## 2022-05-03 ENCOUNTER — HOSPITAL ENCOUNTER (EMERGENCY)
Facility: MEDICAL CENTER | Age: 27
End: 2022-05-03
Attending: EMERGENCY MEDICINE
Payer: COMMERCIAL

## 2022-05-03 VITALS
HEIGHT: 67 IN | TEMPERATURE: 96.6 F | DIASTOLIC BLOOD PRESSURE: 72 MMHG | OXYGEN SATURATION: 99 % | BODY MASS INDEX: 28.25 KG/M2 | HEART RATE: 68 BPM | RESPIRATION RATE: 16 BRPM | WEIGHT: 180 LBS | SYSTOLIC BLOOD PRESSURE: 139 MMHG

## 2022-05-03 DIAGNOSIS — T30.0 BURN BY HOT LIQUID: ICD-10-CM
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
## 2022-05-03 PROCEDURE — 99283 EMERGENCY DEPT VISIT LOW MDM: CPT

## 2022-05-03 PROCEDURE — A9270 NON-COVERED ITEM OR SERVICE: HCPCS | Performed by: EMERGENCY MEDICINE

## 2022-05-03 PROCEDURE — 700102 HCHG RX REV CODE 250 W/ 637 OVERRIDE(OP): Performed by: EMERGENCY MEDICINE

## 2022-05-03 PROCEDURE — 16020 DRESS/DEBRID P-THICK BURN S: CPT

## 2022-05-03 RX ORDER — IBUPROFEN 600 MG/1
600 TABLET ORAL ONCE
Status: COMPLETED | OUTPATIENT
Start: 2022-05-03 | End: 2022-05-03

## 2022-05-03 RX ORDER — IBUPROFEN 600 MG/1
600 TABLET ORAL EVERY 6 HOURS PRN
Qty: 20 TABLET | Refills: 0 | Status: SHIPPED | OUTPATIENT
Start: 2022-05-03

## 2022-05-03 RX ORDER — ACETAMINOPHEN 325 MG/1
650 TABLET ORAL ONCE
Status: COMPLETED | OUTPATIENT
Start: 2022-05-03 | End: 2022-05-03

## 2022-05-03 RX ADMIN — ACETAMINOPHEN 650 MG: 325 TABLET, FILM COATED ORAL at 07:53

## 2022-05-03 RX ADMIN — IBUPROFEN 600 MG: 600 TABLET ORAL at 07:53

## 2022-05-03 ASSESSMENT — FIBROSIS 4 INDEX: FIB4 SCORE: 0.46
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
## 2023-01-25 ENCOUNTER — APPOINTMENT (OUTPATIENT)
Dept: RADIOLOGY | Facility: MEDICAL CENTER | Age: 28
End: 2023-01-25
Attending: EMERGENCY MEDICINE
Payer: COMMERCIAL

## 2023-01-25 ENCOUNTER — EH NON-PROVIDER (OUTPATIENT)
Dept: OCCUPATIONAL MEDICINE | Facility: CLINIC | Age: 28
End: 2023-01-25
Payer: MEDICAID

## 2023-01-25 ENCOUNTER — HOSPITAL ENCOUNTER (EMERGENCY)
Facility: MEDICAL CENTER | Age: 28
End: 2023-01-25
Attending: EMERGENCY MEDICINE
Payer: COMMERCIAL

## 2023-01-25 VITALS
OXYGEN SATURATION: 98 % | TEMPERATURE: 98.4 F | RESPIRATION RATE: 14 BRPM | DIASTOLIC BLOOD PRESSURE: 74 MMHG | HEIGHT: 67 IN | HEART RATE: 75 BPM | BODY MASS INDEX: 29.72 KG/M2 | SYSTOLIC BLOOD PRESSURE: 135 MMHG | WEIGHT: 189.38 LBS

## 2023-01-25 DIAGNOSIS — S61.432A PUNCTURE WOUND OF LEFT HAND WITHOUT FOREIGN BODY, INITIAL ENCOUNTER: ICD-10-CM

## 2023-01-25 DIAGNOSIS — Z02.89 ENCOUNTER FOR OCCUPATIONAL HEALTH ASSESSMENT: Primary | ICD-10-CM

## 2023-01-25 PROCEDURE — 99283 EMERGENCY DEPT VISIT LOW MDM: CPT

## 2023-01-25 PROCEDURE — 700102 HCHG RX REV CODE 250 W/ 637 OVERRIDE(OP): Performed by: EMERGENCY MEDICINE

## 2023-01-25 PROCEDURE — A9270 NON-COVERED ITEM OR SERVICE: HCPCS | Performed by: EMERGENCY MEDICINE

## 2023-01-25 PROCEDURE — 82075 ASSAY OF BREATH ETHANOL: CPT | Performed by: NURSE PRACTITIONER

## 2023-01-25 PROCEDURE — 700111 HCHG RX REV CODE 636 W/ 250 OVERRIDE (IP): Performed by: EMERGENCY MEDICINE

## 2023-01-25 PROCEDURE — 90715 TDAP VACCINE 7 YRS/> IM: CPT | Performed by: EMERGENCY MEDICINE

## 2023-01-25 PROCEDURE — 80305 DRUG TEST PRSMV DIR OPT OBS: CPT | Performed by: NURSE PRACTITIONER

## 2023-01-25 PROCEDURE — 90471 IMMUNIZATION ADMIN: CPT

## 2023-01-25 PROCEDURE — 73130 X-RAY EXAM OF HAND: CPT | Mod: LT

## 2023-01-25 RX ORDER — CEPHALEXIN 500 MG/1
500 CAPSULE ORAL 4 TIMES DAILY
Qty: 28 CAPSULE | Refills: 0 | Status: ACTIVE | OUTPATIENT
Start: 2023-01-25 | End: 2023-02-01

## 2023-01-25 RX ORDER — CEPHALEXIN 500 MG/1
500 CAPSULE ORAL ONCE
Status: COMPLETED | OUTPATIENT
Start: 2023-01-25 | End: 2023-01-25

## 2023-01-25 RX ADMIN — CEPHALEXIN 500 MG: 500 CAPSULE ORAL at 17:05

## 2023-01-25 RX ADMIN — CLOSTRIDIUM TETANI TOXOID ANTIGEN (FORMALDEHYDE INACTIVATED), CORYNEBACTERIUM DIPHTHERIAE TOXOID ANTIGEN (FORMALDEHYDE INACTIVATED), BORDETELLA PERTUSSIS TOXOID ANTIGEN (GLUTARALDEHYDE INACTIVATED), BORDETELLA PERTUSSIS FILAMENTOUS HEMAGGLUTININ ANTIGEN (FORMALDEHYDE INACTIVATED), BORDETELLA PERTUSSIS PERTACTIN ANTIGEN, AND BORDETELLA PERTUSSIS FIMBRIAE 2/3 ANTIGEN 0.5 ML: 5; 2; 2.5; 5; 3; 5 INJECTION, SUSPENSION INTRAMUSCULAR at 17:07

## 2023-01-25 NOTE — LETTER
"  FORM C-4:  EMPLOYEE’S CLAIM FOR COMPENSATION/ REPORT OF INITIAL TREATMENT  EMPLOYEE’S CLAIM - PROVIDE ALL INFORMATION REQUESTED   First Name Jose Antonio Last Name Feliciano Birthdate 1995  Sex female Claim Number   Home Address [address]   [facility]             [place] [address]                                   Age  27 y.o. 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 (if applicable) Checking the daily temperatures    How did this injury or occupational disease occur? Be specific and answer in detail. 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 When I wiped of the metal prob to check the next product the prob stuck me and went through my hand. I felt it hit my middle finger bone.   If you believe that you have an occupational disease, when did you first have knowledge of the disability and it relationship to your employment? n/a Witnesses to the Accident  Annmarie Ferraro   Nature of Injury or Occupational Disease  Workers' Compensation Part(s) of Body Injured or Affected  Hand (L), Finger (L), Finger (L)    I CERTIFY THAT THE ABOVE IS TRUE AND " CORRECT TO THE BEST OF MY KNOWLEDGE AND THAT I HAVE PROVIDED THIS INFORMATION IN ORDER TO OBTAIN THE BENEFITS OF [place]).  I HEREBY AUTHORIZE ANY PHYSICIAN, CHIROPRACTOR, SURGEON, PRACTITIONER, OR OTHER PERSON, ANY HOSPITAL, INCLUDING [facility] OR [facility], ANY MEDICAL SERVICE ORGANIZATION, ANY INSURANCE COMPANY, OR OTHER INSTITUTION OR ORGANIZATION TO RELEASE TO EACH OTHER, ANY MEDICAL OR OTHER INFORMATION, INCLUDING BENEFITS PAID OR PAYABLE, PERTINENT TO THIS INJURY OR DISEASE, EXCEPT INFORMATION RELATIVE TO DIAGNOSIS, TREATMENT AND/OR COUNSELING FOR AIDS, PSYCHOLOGICAL CONDITIONS, ALCOHOL OR CONTROLLED SUBSTANCES, FOR WHICH I MUST GIVE SPECIFIC AUTHORIZATION.  A PHOTOSTAT OF THIS AUTHORIZATION SHALL BE AS VALID AS THE ORIGINAL.  Date   01/25/2023     Place   [facility]   Employee’s Signature   THIS REPORT MUST BE COMPLETED AND MAILED WITHIN 3 WORKING DAYS OF TREATMENT   Place [facility], EMERGENCY DEPT                       Name of Facility [facility]   Date  1/25/2023 Diagnosis  (S61.432A) Puncture wound of left hand without foreign body, initial encounter Is there evidence the injured employee was under the influence of alcohol and/or another controlled substance at the time of accident?   Hour  5:41 PM Description of Injury or Disease  Puncture wound of left hand without foreign body, initial encounter No   Treatment  Physician evaluation and treatment of puncture wound to left hand  Have you advised the patient to remain off work five days or more?         No   X-Ray Findings  Negative If Yes   From Date    To Date      From information given by the employee, together with medical evidence, can you directly connect this injury or occupational disease as job incurred? Yes If No, is employee capable of: Full Duty  Yes Modified Duty      Is additional  "medical care by a physician indicated? No If Modified Duty, Specify any Limitations / Restrictions       Do you know of any previous injury or disease contributing to this condition or occupational disease? No    Date 1/25/2023 Print Doctor’s Name Albert Dias I certify the employer’s copy of this form was mailed on:   Address [address] [place] [address]  [place] [id number] [phone] INSURER’S USE ONLY   Provider’s Tax ID Number [id number] Telephone Dept: [phone]    Doctor’s Signature e-ALBERT Blanco M.D. Degree  M.D.      Form C-4 (rev.10/07)                                                                         BRIEF DESCRIPTION OF RIGHTS AND BENEFITS  (Pursuant to [place] 616[place].050)    Notice of Injury or Occupational Disease (Incident Report Form C-1): If an injury or occupational disease (OD) arises out of and in the course of employment, you must provide written notice to your employer as soon as practicable, but no later than 7 days after the accident or OD. Your employer shall maintain a sufficient supply of the required forms.    Claim for Compensation (Form C-4): If medical treatment is sought, the form C-4 is available at the place of initial treatment. A completed \"Claim for Compensation\" (Form C-4) must be filed within 90 days after an accident or OD. The treating physician or chiropractor must, within 3 working days after treatment, complete and mail to the employer, the employer's insurer and third-party , the Claim for Compensation.    Medical Treatment: If you require medical treatment for your on-the-job injury or OD, you may be required to select a physician or chiropractor from a list provided by your workers’ compensation insurer, if it has contracted with an Organization for Managed Care (MCO) or Preferred Provider Organization (PPO) or providers of health care. If your employer has not entered into a contract with an MCO or PPO, you may select a physician or chiropractor from " the Panel of Physicians and Chiropractors. Any medical costs related to your industrial injury or OD will be paid by your insurer.    Temporary Total Disability (TTD): If your doctor has certified that you are unable to work for a period of at least 5 consecutive days, or 5 cumulative days in a 20-day period, or places restrictions on you that your employer does not accommodate, you may be entitled to TTD compensation.    Temporary Partial Disability (TPD): If the wage you receive upon reemployment is less than the compensation for TTD to which you are entitled, the insurer may be required to pay you TPD compensation to make up the difference. TPD can only be paid for a maximum of 24 months.    Permanent Partial Disability (PPD): When your medical condition is stable and there is an indication of a PPD as a result of your injury or OD, within 30 days, your insurer must arrange for an evaluation by a rating physician or chiropractor to determine the degree of your PPD. The amount of your PPD award depends on the date of injury, the results of the PPD evaluation, your age and wage.    Permanent Total Disability (PTD): If you are medically certified by a treating physician or chiropractor as permanently and totally disabled and have been granted a PTD status by your insurer, you are entitled to receive monthly benefits not to exceed 66 2/3% of your average monthly wage. The amount of your PTD payments is subject to reduction if you previously received a lump-sum PPD award.    Vocational Rehabilitation Services: You may be eligible for vocational rehabilitation services if you are unable to return to the job due to a permanent physical impairment or permanent restrictions as a result of your injury or occupational disease.    Transportation and Per Barbara Reimbursement: You may be eligible for travel expenses and per barbara associated with medical treatment.    Reopening: You may be able to reopen your claim if your  condition worsens after claim closure.     Appeal Process: If you disagree with a written determination issued by the insurer or the insurer does not respond to your request, you may appeal to the Department of Administration, , by following the instructions contained in your determination letter. You must appeal the determination within 70 days from the date of the determination letter at [address], [address], [place], [place] [address], or [address], [address], [place], [place] [address]. If you disagree with the  decision, you may appeal to the Department of Administration, . You must file your appeal within 30 days from the date of the  decision letter at [address], [address], [place], [place] [address], or [address] [facility] [address], [place], [place] [address]. If you disagree with a decision of an , you may file a petition for judicial review with the District Court. You must do so within 30 days of the Appeal Officer’s decision. You may be represented by an  at your own expense or you may contact the [facility] for possible representation.    [place]  for Injured Workers (NAIW): If you disagree with a  decision, you may request that NAIW represent you without charge at an  Hearing. For information regarding denial of benefits, you may contact the [facility] at: [address], [address], [place] [address], [phone], or [address] [facility] [address], [place] [address], [phone]    To File a Complaint with the Division: If you wish to file a complaint with the  of the Division of Industrial Relations (DIR),  please contact the Workers’ Compensation Section, [address], [facility] [address], [place], [place] [address], telephone [phone], or [address], [place], [place] [address], telephone [phone]  [phone].    For assistance with Workers’ Compensation Issues: You may contact the [facility] Office for Consumer Health Assistance, [facility] [facility], [address] [facility] [address], [place] [address], Toll Free 1-670.791.6375, Web site: http://[facility].[place].gov/Programs/RACHEL E-mail: rachel@[facility].[place].gov  D-2 (rev. 10/20)              __________________________________________________________________                                    __01/25/2023_______________            Employee Name / Signature                                                                                                                            Date

## 2023-01-26 NOTE — ED PROVIDER NOTES
"ED Provider Note    CHIEF COMPLAINT  Chief Complaint   Patient presents with    Hand Injury     PT was at work when she experienced a puncture wound in left hand from thermometer probe which is used for obtaining temperature on food, PT states probe is rarely sanitized. PT uncertain of tetanus status.        EXTERNAL RECORDS REVIEWED  None    HPI/ROS  LIMITATION TO HISTORY   None  OUTSIDE HISTORIAN(S):  None    Jose Antonio Wiggins is a 27 y.o. female who presents for evaluation of an acute occupational injury to the left hand.  The patient works at a local [employer].  She reports that a meat thermometer which apparently is really sanitized accidentally punctured the palmar aspect of her left hand in between the webspace of her second and third digit.  She does not think it went through and through but it clearly punctured the soft tissues and that she thinks it may have \"went down to the bone.  \"Injury occurred within the last 2 hours.  She denies any significant medical or surgical history.  She cannot recall when her last tetanus vaccine was.  She has no allergies.  No other complaints.    PAST MEDICAL HISTORY   has a past medical history of Anemia (10/16/2012), Aortic septal defect (Diagnosed 3/2007), H/O heart surgery \"ASD\" Has implant card in media  (2012), Heart murmur, and Proteinuria 2+ with voided specimen with first visit (2012).    SURGICAL HISTORY   has a past surgical history that includes other cardiac surgery.    FAMILY HISTORY  Family History   Problem Relation Age of Onset    Diabetes Maternal Grandfather        SOCIAL HISTORY  Social History     Tobacco Use    Smoking status: Former     Packs/day: 0.25     Years: 2.00     Pack years: 0.50     Types: Cigarettes     Quit date: 10/1/2013     Years since quittin.3    Smokeless tobacco: Never    Tobacco comments:     1/2 pack a day. Pt. quit when she found out she was pregnanct.    Vaping Use    Vaping Use: Never used   Substance " "and Sexual Activity    Alcohol use: Not Currently     Comment: occ    Drug use: No    Sexual activity: Yes     Partners: Male     Comment: none       CURRENT MEDICATIONS  Home Medications       Reviewed by Charmaine Luciano R.N. (Registered Nurse) on 01/25/23 at 1613  Med List Status: Partial     Medication Last Dose Status   azithromycin (ZITHROMAX) 250 MG Tab  Active   ibuprofen (MOTRIN) 600 MG Tab  Active   promethazine-dextromethorphan (PROMETHAZINE-DM) 6.25-15 MG/5ML syrup  Active                    ALLERGIES  Allergies   Allergen Reactions    Norco [Hydrocodone-Acetaminophen] Rash     rash       PHYSICAL EXAM  VITAL SIGNS: /78   Pulse 77   Temp 36.7 °C (98.1 °F) (Temporal)   Resp 14   Ht 1.702 m (5' 7\")   Wt 85.9 kg (189 lb 6 oz)   SpO2 97%   BMI 29.66 kg/m²    Constitutional: Alert and oriented x 3, no acute distress.  HENT: Normocephalic, Atraumatic, Bilateral external ears normal. Nose normal.   Eyes: Pupils are equal and reactive. Conjunctiva normal, non-icteric.   Heart: Regular rate and rythm,   Lungs: No respiratory distress lungs clear to auscultation bilaterally  GI: Soft nontender nondistended   Skin: Warm, Dry, No erythema, No rash.   Extremities: Left hand exam is notable for a 7 x 7 mm puncture wound nonsuturable on the volar aspect just proximal to the webspace of the second and third digits.  It did not go through and through.  There is some subtle erythema.  No lymphangitis.  Flexion extension in all 5 digits is normal.  Neurovascular exam is normal.  No exposed tendon or bone.  Neurologic: Cranial nerves III through XII grossly intact no sensory deficit no cerebellar dysfunction.   Psychiatric: Appropriate affect for situation      DIAGNOSTIC STUDIES / PROCEDURES    RADIOLOGY  I have independently interpreted the diagnostic imaging associated with this visit and am waiting the final reading from the radiologist.   My preliminary interpretation is a follows: No bony injury no " foreign body  DX-HAND 3+ LEFT   Final Result      1.  Unremarkable left hand series.          COURSE & MEDICAL DECISION MAKING      INITIAL ASSESSMENT AND PLAN  Care Narrative: Patient presents here after a workplace related puncture wound to left hand.  It was not suturable and had not apparently go through and through.  Radiograph did not demonstrate any bony injury.  She was given a tetanus shot and first dose of oral antibiotics for infection prophylaxis.  There was not a large enough wound to washout as it seemed superficial.  Patient has a normal neurovascular exam.  I will discharge her home on Keflex and recommend NSAIDs and Tylenol.  I have recommended following up with occupational health for wound check and 48 hours    ADDITIONAL PROBLEM LIST AND DISPOSITION      FINAL DIAGNOSIS  Puncture wound to the left hand           Electronically signed by: Albert Dias M.D., 1/25/2023 4:38 PM

## 2023-01-26 NOTE — ED TRIAGE NOTES
Chief Complaint   Patient presents with    Hand Injury     PT was at work when she experienced a puncture wound in left hand from thermometer probe which is used for obtaining temperature on food, PT states probe is rarely sanitized. PT uncertain of tetanus status.      PT ambulatory to triage for above complaint. GCS 15     Pt is alert and oriented, speaking in full sentences, follows commands and responds appropriately to questions. NAD. Resp are even and unlabored.      Pt placed in lobby. Pt educated on triage process. Pt encouraged to alert staff for any changes.     Patient and staff wearing appropriate PPE

## 2023-02-24 LAB
AMP AMPHETAMINE: NORMAL
BREATH ALCOHOL COMMENT: NORMAL
COC COCAINE: NORMAL
INT CON NEG: NORMAL
INT CON POS: NORMAL
MET METHAMPHETAMINES: NORMAL
OPI OPIATES: NORMAL
PCP PHENCYCLIDINE: NORMAL
POC BREATHALIZER: 0 PERCENT (ref 0–0.01)
POC DRUG COMMENT 753798-OCCUPATIONAL HEALTH: NORMAL
THC: NORMAL

## 2023-12-10 ENCOUNTER — HOSPITAL ENCOUNTER (EMERGENCY)
Facility: MEDICAL CENTER | Age: 28
End: 2023-12-10
Attending: EMERGENCY MEDICINE
Payer: MEDICAID

## 2023-12-10 VITALS
TEMPERATURE: 97.6 F | RESPIRATION RATE: 16 BRPM | HEART RATE: 76 BPM | BODY MASS INDEX: 28.06 KG/M2 | OXYGEN SATURATION: 98 % | WEIGHT: 178.79 LBS | HEIGHT: 67 IN | DIASTOLIC BLOOD PRESSURE: 87 MMHG | SYSTOLIC BLOOD PRESSURE: 139 MMHG

## 2023-12-10 DIAGNOSIS — S01.511A LIP LACERATION, INITIAL ENCOUNTER: ICD-10-CM

## 2023-12-10 DIAGNOSIS — S09.93XA DENTAL INJURY, INITIAL ENCOUNTER: ICD-10-CM

## 2023-12-10 PROCEDURE — 99282 EMERGENCY DEPT VISIT SF MDM: CPT

## 2023-12-10 ASSESSMENT — PAIN DESCRIPTION - PAIN TYPE: TYPE: ACUTE PAIN
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
## 2024-09-10 ENCOUNTER — HOSPITAL ENCOUNTER (EMERGENCY)
Facility: MEDICAL CENTER | Age: 29
End: 2024-09-10
Attending: EMERGENCY MEDICINE
Payer: MEDICAID

## 2024-09-10 VITALS
HEIGHT: 67 IN | WEIGHT: 182.76 LBS | TEMPERATURE: 97.9 F | SYSTOLIC BLOOD PRESSURE: 143 MMHG | DIASTOLIC BLOOD PRESSURE: 96 MMHG | OXYGEN SATURATION: 99 % | BODY MASS INDEX: 28.69 KG/M2 | RESPIRATION RATE: 18 BRPM | HEART RATE: 69 BPM

## 2024-09-10 DIAGNOSIS — J02.9 PHARYNGITIS, UNSPECIFIED ETIOLOGY: ICD-10-CM

## 2024-09-10 LAB
FLUAV RNA SPEC QL NAA+PROBE: NEGATIVE
FLUBV RNA SPEC QL NAA+PROBE: NEGATIVE
RSV RNA SPEC QL NAA+PROBE: NEGATIVE
S PYO DNA SPEC NAA+PROBE: NOT DETECTED
SARS-COV-2 RNA RESP QL NAA+PROBE: NOTDETECTED

## 2024-09-10 PROCEDURE — 0241U HCHG SARS-COV-2 COVID-19 NFCT DS RESP RNA 4 TRGT ED POC: CPT

## 2024-09-10 PROCEDURE — 99283 EMERGENCY DEPT VISIT LOW MDM: CPT

## 2024-09-10 PROCEDURE — 87651 STREP A DNA AMP PROBE: CPT

## 2024-09-10 NOTE — ED PROVIDER NOTES
"ED Provider Note    CHIEF COMPLAINT  Chief Complaint   Patient presents with    Sore Throat     Woke up this morning with sore throat, mild cough, runny nose, denies body aches. 7/10 sore, burning pain. Denies covid/flu vaccine.   States her coworkers have been positive for strep and covid.        EXTERNAL RECORDS REVIEWED  Inpatient Notes ER note December 2023 for dental injury    HPI/ROS  LIMITATION TO HISTORY   Select: : None  OUTSIDE HISTORIAN(S):  None    Jose Antonio Wiggins is a 29 y.o. female who presents to the ER with 1 day of sore throat.  Recently exposed both to COVID and strep.  Now concern for the same.  No medications taken prior to arrival.    PAST MEDICAL HISTORY   has a past medical history of Anemia (10/16/2012), Aortic septal defect (Diagnosed 3/2007), H/O heart surgery \"ASD\" Has implant card in media 2008 (06/20/2012), Heart murmur, Patient denies medical problems, and Proteinuria 2+ with voided specimen with first visit (06/20/2012).    SURGICAL HISTORY   has a past surgical history that includes other cardiac surgery.    FAMILY HISTORY  Family History   Problem Relation Age of Onset    Diabetes Maternal Grandfather        SOCIAL HISTORY  Social History     Tobacco Use    Smoking status: Some Days     Current packs/day: 0.00     Average packs/day: 0.3 packs/day for 2.0 years (0.5 ttl pk-yrs)     Types: Cigarettes     Start date: 10/1/2011     Last attempt to quit: 10/1/2013     Years since quitting: 10.9    Smokeless tobacco: Never    Tobacco comments:     1/2 pack a day. Pt. quit when she found out she was pregnanct.    Vaping Use    Vaping status: Never Used   Substance and Sexual Activity    Alcohol use: Not Currently     Comment: occ    Drug use: No    Sexual activity: Yes     Partners: Male     Comment: none       CURRENT MEDICATIONS  Home Medications       Reviewed by Rochelle Frias R.N. (Registered Nurse) on 09/10/24 at 0637  Med List Status: Partial     Medication Last Dose Status " "  azithromycin (ZITHROMAX) 250 MG Tab  Active   ibuprofen (MOTRIN) 600 MG Tab  Active   promethazine-dextromethorphan (PROMETHAZINE-DM) 6.25-15 MG/5ML syrup  Active                    ALLERGIES  Allergies   Allergen Reactions    Norco [Hydrocodone-Acetaminophen] Rash     rash       PHYSICAL EXAM  VITAL SIGNS: BP (!) 143/96   Pulse 69   Temp 36.6 °C (97.9 °F) (Temporal)   Resp 18   Ht 1.702 m (5' 7\")   Wt 82.9 kg (182 lb 12.2 oz)   LMP 09/03/2024   SpO2 99%   BMI 28.62 kg/m²          Pulse ox interpretation: I interpret this pulse ox as normal.  Constitutional: Alert in no apparent distress.  HENT: No signs of trauma, Bilateral external ears normal, Nose normal.  Posterior pharynx is clear.  No tissue abnormalities.  No erythema or exudates.  Tongue and uvula are midline without edema or deviation.  Good phonation of voice.  Tolerating secretions.  Eyes: Pupils are equal and reactive  Neck: Normal range of motion, No tenderness, Supple, No stridor.   Cardiovascular: Regular rate and rhythm, no murmurs.   Thorax & Lungs: Normal breath sounds, No respiratory distress, No wheezing, No chest tenderness.   Abdomen: Bowel sounds normal, Soft, No tenderness  Skin: Warm, Dry, No erythema, No rash.   Musculoskeletal: Good range of motion in all major joints. No tenderness to palpation or major deformities noted.   Neurologic: Alert , Normal motor function, Normal sensory function, No focal deficits noted.   Psychiatric: Affect normal, Judgment normal, Mood normal.         EKG/LABS  Results for orders placed or performed during the hospital encounter of 09/10/24   Group A Strep by PCR    Specimen: Throat   Result Value Ref Range    Group A Strep by PCR Not Detected Not Detected   POC CoV-2, FLU A/B, RSV by PCR   Result Value Ref Range    POC Influenza A RNA, PCR Negative Negative    POC Influenza B RNA, PCR Negative Negative    POC RSV, by PCR Negative Negative    POC SARS-CoV-2, PCR NotDetected NotDetected "         COURSE & MEDICAL DECISION MAKING    ASSESSMENT, COURSE AND PLAN  Care Narrative: 29-year-old presenting to the emerged part with pharyngitis symptoms.  Will complete strep test and viral panel.  At this point given benign exam will not escalate to additional advanced imaging or hematologic labs    DISPOSITION AND DISCUSSIONS  I have discussed management of the patient with the following physicians and BURT's: None    Discussion of management with other QHP or appropriate source(s): None     Escalation of care considered, and ultimately not performed:diagnostic imaging and acute inpatient care management, however at this time, the patient is most appropriate for outpatient management    Barriers to care at this time, including but not limited to: Patient does not have established PCP.     29-year-old presenting with sore throat after multiple sick contacts.  Workup today is benign.  Strep test negative.  Viral panel for COVID, flu and RSV negative.  While the patient continue with conservative care measures at home.  Understanding of medications to use for comfort from the store.  Will return to the ER with any changes or worsening or new symptoms.    Given lack of PCP she has been referred to local clinic for follow-up.    FINAL DIAGNOSIS  1. Pharyngitis, unspecified etiology         Electronically signed by: Albert Allen M.D., 9/10/2024 7:09 AM

## 2024-09-10 NOTE — ED NOTES
PT ambulated back to room with steady gait. PT agrees with triage note. PT placed on monitor, call light within reach, and chart up for any available ERP.

## 2024-09-10 NOTE — Clinical Note
Jose Antonio Wiggins was seen and treated in our emergency department on 9/10/2024.  She may return to work on 09/13/2024.       If you have any questions or concerns, please don't hesitate to call.      Albert Allen M.D.

## 2024-09-10 NOTE — ED NOTES
Discharge orders recieved. Discharge instructions provided by ERP. AVS provided and reviewed with patient. Patient AOx4 and ambulatory. No IV placed during ED visit. Patient discharged to self without incident.

## 2024-09-26 ENCOUNTER — OFFICE VISIT (OUTPATIENT)
Dept: URGENT CARE | Facility: CLINIC | Age: 29
End: 2024-09-26
Payer: MEDICAID

## 2024-09-26 ENCOUNTER — HOSPITAL ENCOUNTER (OUTPATIENT)
Facility: MEDICAL CENTER | Age: 29
End: 2024-09-26
Attending: PHYSICIAN ASSISTANT
Payer: MEDICAID

## 2024-09-26 VITALS
SYSTOLIC BLOOD PRESSURE: 122 MMHG | HEIGHT: 67 IN | WEIGHT: 183 LBS | RESPIRATION RATE: 16 BRPM | HEART RATE: 74 BPM | OXYGEN SATURATION: 98 % | DIASTOLIC BLOOD PRESSURE: 70 MMHG | BODY MASS INDEX: 28.72 KG/M2 | TEMPERATURE: 97.4 F

## 2024-09-26 DIAGNOSIS — N76.0 ACUTE VAGINITIS: ICD-10-CM

## 2024-09-26 DIAGNOSIS — R39.9 UTI SYMPTOMS: Primary | ICD-10-CM

## 2024-09-26 LAB
APPEARANCE UR: NORMAL
BILIRUB UR STRIP-MCNC: NORMAL MG/DL
COLOR UR AUTO: NORMAL
GLUCOSE UR STRIP.AUTO-MCNC: NEGATIVE MG/DL
KETONES UR STRIP.AUTO-MCNC: NEGATIVE MG/DL
LEUKOCYTE ESTERASE UR QL STRIP.AUTO: NEGATIVE
NITRITE UR QL STRIP.AUTO: NEGATIVE
PH UR STRIP.AUTO: 5.5 [PH] (ref 5–8)
PROT UR QL STRIP: NEGATIVE MG/DL
RBC UR QL AUTO: NEGATIVE
SP GR UR STRIP.AUTO: >=1.03
UROBILINOGEN UR STRIP-MCNC: NORMAL MG/DL

## 2024-09-26 PROCEDURE — 87491 CHLMYD TRACH DNA AMP PROBE: CPT

## 2024-09-26 PROCEDURE — 3078F DIAST BP <80 MM HG: CPT | Performed by: PHYSICIAN ASSISTANT

## 2024-09-26 PROCEDURE — 99203 OFFICE O/P NEW LOW 30 MIN: CPT | Performed by: PHYSICIAN ASSISTANT

## 2024-09-26 PROCEDURE — 81002 URINALYSIS NONAUTO W/O SCOPE: CPT | Performed by: PHYSICIAN ASSISTANT

## 2024-09-26 PROCEDURE — 87591 N.GONORRHOEAE DNA AMP PROB: CPT

## 2024-09-26 PROCEDURE — 87510 GARDNER VAG DNA DIR PROBE: CPT

## 2024-09-26 PROCEDURE — 87660 TRICHOMONAS VAGIN DIR PROBE: CPT

## 2024-09-26 PROCEDURE — 87480 CANDIDA DNA DIR PROBE: CPT

## 2024-09-26 PROCEDURE — 3074F SYST BP LT 130 MM HG: CPT | Performed by: PHYSICIAN ASSISTANT

## 2024-09-26 RX ORDER — FLUCONAZOLE 150 MG/1
TABLET ORAL
Qty: 2 TABLET | Refills: 0 | Status: SHIPPED | OUTPATIENT
Start: 2024-09-26

## 2024-09-26 ASSESSMENT — ENCOUNTER SYMPTOMS
CHILLS: 0
VOMITING: 0
FLANK PAIN: 0
NAUSEA: 0
ABDOMINAL PAIN: 0
FEVER: 0

## 2024-09-26 NOTE — LETTER
[facility] [place] [id number] [facility] [id number]     September 26, 2024    Patient: Jose Antonio Wiggins   YOB: 1995   Date of Visit: 9/26/2024       To Whom It May Concern:    Jose Antonio Wiggins was seen and treated in our department on 9/26/2024.  She should be excused from work for today    Sincerely,     Aramis Cobb P.A.-C.

## 2024-09-26 NOTE — PROGRESS NOTES
Subjective:     Jose Antonio Wiggins  is a 29 y.o. female who presents for UTI (X 3 weeks/ Light discharge in urine/ frequency in urination / pt requesting std testing )      UTI  This is a new problem. The current episode started 1 to 4 weeks ago. Pertinent negatives include no abdominal pain, chills, fever, nausea or vomiting.   Patient presents urgent care describing 3 weeks of symptoms concerning for vaginitis versus urinary tract infection.  She notes some mild urinary frequency and urgency.  She complains of some incomplete voiding.  She notes some light waxing and waning white discharge.  She denies fevers chills or nausea or vomiting.  She denies flank pain or abdominal pain.  She denies recent antibiotics.  Her last menstrual period was normal in around 3 weeks ago.  She is recently stopped being with a prior sexual partner and would like testing for STIs.  She denies knowledge of exposures.  She denies treatments tried thus far but she acknowledges she does take apple cider vinegar daily.    Review of Systems   Constitutional:  Negative for chills and fever.   Gastrointestinal:  Negative for abdominal pain, nausea and vomiting.   Genitourinary:  Positive for frequency and urgency. Negative for dysuria, flank pain and hematuria.        Frequent urination and abnormal vaginal discharge       Medications:    azithromycin Tabs  ibuprofen Tabs  promethazine-dextromethorphan    Allergies: Norco [hydrocodone-acetaminophen]    Problem List: Jose Antonio Wiggins does not have any pertinent problems on file.    Surgical History:  Past Surgical History:   Procedure Laterality Date    OTHER CARDIAC SURGERY      as a child       Past Social Hx: Jose Antonio Wiggins  reports that she has been smoking cigarettes. She started smoking about 12 years ago. She has a 0.5 pack-year smoking history. She has never used smokeless tobacco. She reports current alcohol use. She reports that she does not use drugs.     Past  "Family Hx:  Jose Antonio Wiggins family history includes Diabetes in her maternal grandfather.     Problem list, medications, and allergies reviewed by myself today in Epic.     Objective:   /70   Pulse 74   Temp 36.3 °C (97.4 °F)   Resp 16   Ht 1.702 m (5' 7\")   Wt 83 kg (183 lb)   LMP 09/03/2024   SpO2 98%   BMI 28.66 kg/m²     Physical Exam  Vitals and nursing note reviewed.   Constitutional:       General: She is not in acute distress.     Appearance: She is well-developed. She is not diaphoretic.   HENT:      Head: Normocephalic and atraumatic.      Right Ear: External ear normal.      Left Ear: External ear normal.      Nose: Nose normal.   Eyes:      General: Lids are normal. No scleral icterus.        Right eye: No discharge.         Left eye: No discharge.      Conjunctiva/sclera: Conjunctivae normal.   Pulmonary:      Effort: Pulmonary effort is normal. No respiratory distress.   Abdominal:      Tenderness: There is no right CVA tenderness or left CVA tenderness.   Musculoskeletal:         General: Normal range of motion.      Cervical back: Neck supple.   Skin:     General: Skin is warm and dry.      Coloration: Skin is not pale.      Findings: No erythema.   Neurological:      Mental Status: She is alert and oriented to person, place, and time. She is not disoriented.   Psychiatric:         Speech: Speech normal.         Behavior: Behavior normal.       Results for orders placed or performed in visit on 09/26/24   POCT Urinalysis   Result Value Ref Range    POC Color DARK YELLOW Negative    POC Appearance SLIGHTLY CLOUDY Negative    POC Glucose NEGATIVE Negative mg/dL    POC Bilirubin SMALL Negative mg/dL    POC Ketones NEGATIVE Negative mg/dL    POC Specific Gravity >=1.030 <1.005 - >1.030    POC Blood NEGATIVE Negative    POC Urine PH 5.5 5.0 - 8.0    POC Protein NEGATIVE Negative mg/dL    POC Urobiligen 1.0 E.U. / DL Negative (0.2) mg/dL    POC Nitrites NEGATIVE Negative    POC " Leukocyte Esterase NEGATIVE Negative     Vaginal pathogens testing is pending  Gonorrhea chlamydia testing is pending    Assessment/Plan:   Assessment      1. UTI symptoms  - POCT Urinalysis    2. Acute vaginitis  - VAGINAL PATHOGENS DNA PANEL; Future  - Chlamydia/GC, PCR (Genital/Anal swab); Future  - fluconazole (DIFLUCAN) 150 MG tablet; Take one tablet orally for yeast infection, if symptoms persist, may repeat treatment after 72 hours.  Dispense: 2 Tablet; Refill: 0  Supportive care is reviewed with patient/caregiver - recommend to push PO fluids and electrolytes, Diflucan for empiric treatment of yeast vaginitis now, pay attention on MyChart for additional results and we will direct treatment accordingly  Return to clinic with lack of resolution or progression of symptoms.      I have worn an N95 mask, gloves and eye protection for the entire encounter with this patient.     Differential diagnosis, natural history, supportive care, and indications for immediate follow-up discussed.

## 2024-09-27 ENCOUNTER — TELEPHONE (OUTPATIENT)
Dept: URGENT CARE | Facility: CLINIC | Age: 29
End: 2024-09-27

## 2024-09-27 LAB
C TRACH DNA GENITAL QL NAA+PROBE: NEGATIVE
CANDIDA DNA VAG QL PROBE+SIG AMP: NEGATIVE
G VAGINALIS DNA VAG QL PROBE+SIG AMP: POSITIVE
N GONORRHOEA DNA GENITAL QL NAA+PROBE: NEGATIVE
SPECIMEN SOURCE: NORMAL
T VAGINALIS DNA VAG QL PROBE+SIG AMP: NEGATIVE

## 2024-09-27 NOTE — TELEPHONE ENCOUNTER
Pt called, stated that she tested positive. Pt would like to know if another rx will be sent over to the pharmacy. Please advise or assist, thank you.

## 2024-09-28 DIAGNOSIS — N76.0 BACTERIAL VAGINOSIS: ICD-10-CM

## 2024-09-28 DIAGNOSIS — B96.89 BACTERIAL VAGINOSIS: ICD-10-CM

## 2024-09-28 RX ORDER — METRONIDAZOLE 500 MG/1
500 TABLET ORAL 2 TIMES DAILY
Qty: 14 TABLET | Refills: 0 | Status: SHIPPED | OUTPATIENT
Start: 2024-09-28 | End: 2024-10-05

## 2024-09-28 NOTE — PROGRESS NOTES
The patient called into clinic requesting treatment for her positive vaginal pathogen swab. The ordering provider is out of the office. Will prescribe the patient Metronidazole for her BV..

## 2024-10-17 ENCOUNTER — HOSPITAL ENCOUNTER (EMERGENCY)
Facility: MEDICAL CENTER | Age: 29
End: 2024-10-17
Attending: EMERGENCY MEDICINE
Payer: MEDICAID

## 2024-10-17 VITALS
BODY MASS INDEX: 29.13 KG/M2 | WEIGHT: 185.63 LBS | HEIGHT: 67 IN | RESPIRATION RATE: 16 BRPM | DIASTOLIC BLOOD PRESSURE: 88 MMHG | OXYGEN SATURATION: 98 % | TEMPERATURE: 98.5 F | HEART RATE: 78 BPM | SYSTOLIC BLOOD PRESSURE: 174 MMHG

## 2024-10-17 DIAGNOSIS — R30.0 DYSURIA: ICD-10-CM

## 2024-10-17 DIAGNOSIS — R10.30 LOWER ABDOMINAL PAIN: ICD-10-CM

## 2024-10-17 LAB
APPEARANCE UR: CLEAR
BACTERIA GENITAL QL WET PREP: NORMAL
BILIRUB UR QL STRIP.AUTO: NEGATIVE
COLOR UR: YELLOW
GLUCOSE UR STRIP.AUTO-MCNC: NEGATIVE MG/DL
HCG UR QL: NEGATIVE
KETONES UR STRIP.AUTO-MCNC: NEGATIVE MG/DL
LEUKOCYTE ESTERASE UR QL STRIP.AUTO: NEGATIVE
MICRO URNS: NORMAL
NITRITE UR QL STRIP.AUTO: NEGATIVE
PH UR STRIP.AUTO: 6 [PH] (ref 5–8)
PROT UR QL STRIP: NEGATIVE MG/DL
RBC UR QL AUTO: NEGATIVE
SIGNIFICANT IND 70042: NORMAL
SITE SITE: NORMAL
SOURCE SOURCE: NORMAL
SP GR UR STRIP.AUTO: 1.02

## 2024-10-17 PROCEDURE — 36415 COLL VENOUS BLD VENIPUNCTURE: CPT

## 2024-10-17 PROCEDURE — 99284 EMERGENCY DEPT VISIT MOD MDM: CPT

## 2024-10-17 PROCEDURE — 96374 THER/PROPH/DIAG INJ IV PUSH: CPT | Mod: XU

## 2024-10-17 PROCEDURE — 81025 URINE PREGNANCY TEST: CPT

## 2024-10-17 PROCEDURE — 87491 CHLMYD TRACH DNA AMP PROBE: CPT

## 2024-10-17 PROCEDURE — 81003 URINALYSIS AUTO W/O SCOPE: CPT

## 2024-10-17 PROCEDURE — 87591 N.GONORRHOEAE DNA AMP PROB: CPT

## 2024-10-18 LAB
C TRACH DNA GENITAL QL NAA+PROBE: NEGATIVE
N GONORRHOEA DNA GENITAL QL NAA+PROBE: NEGATIVE
SPECIMEN SOURCE: NORMAL

## 2024-11-20 ENCOUNTER — OFFICE VISIT (OUTPATIENT)
Dept: URGENT CARE | Facility: CLINIC | Age: 29
End: 2024-11-20
Payer: MEDICAID

## 2024-11-20 ENCOUNTER — HOSPITAL ENCOUNTER (OUTPATIENT)
Facility: MEDICAL CENTER | Age: 29
End: 2024-11-20
Attending: NURSE PRACTITIONER
Payer: MEDICAID

## 2024-11-20 VITALS
OXYGEN SATURATION: 97 % | WEIGHT: 176.8 LBS | TEMPERATURE: 97.6 F | HEIGHT: 67 IN | DIASTOLIC BLOOD PRESSURE: 64 MMHG | RESPIRATION RATE: 18 BRPM | BODY MASS INDEX: 27.75 KG/M2 | SYSTOLIC BLOOD PRESSURE: 116 MMHG | HEART RATE: 96 BPM

## 2024-11-20 DIAGNOSIS — J06.9 VIRAL URI: ICD-10-CM

## 2024-11-20 DIAGNOSIS — N89.8 VAGINAL DISCHARGE: ICD-10-CM

## 2024-11-20 LAB — S PYO DNA SPEC NAA+PROBE: NOT DETECTED

## 2024-11-20 PROCEDURE — 87660 TRICHOMONAS VAGIN DIR PROBE: CPT

## 2024-11-20 PROCEDURE — 87510 GARDNER VAG DNA DIR PROBE: CPT

## 2024-11-20 PROCEDURE — 87651 STREP A DNA AMP PROBE: CPT | Performed by: NURSE PRACTITIONER

## 2024-11-20 PROCEDURE — 87480 CANDIDA DNA DIR PROBE: CPT

## 2024-11-20 PROCEDURE — 87591 N.GONORRHOEAE DNA AMP PROB: CPT

## 2024-11-20 PROCEDURE — 87491 CHLMYD TRACH DNA AMP PROBE: CPT

## 2024-11-20 PROCEDURE — 99213 OFFICE O/P EST LOW 20 MIN: CPT | Performed by: NURSE PRACTITIONER

## 2024-11-20 RX ORDER — DEXAMETHASONE SODIUM PHOSPHATE 10 MG/ML
10 INJECTION INTRAMUSCULAR; INTRAVENOUS ONCE
Status: COMPLETED | OUTPATIENT
Start: 2024-11-20 | End: 2024-11-20

## 2024-11-20 RX ADMIN — DEXAMETHASONE SODIUM PHOSPHATE 10 MG: 10 INJECTION INTRAMUSCULAR; INTRAVENOUS at 12:50

## 2024-11-20 NOTE — PROGRESS NOTES
"Chief Complaint   Patient presents with    Pharyngitis     Sore throat started yesterday and noticed white spots on back of throat this morning. X1 month ago patient had BV and thinks may have it again due to \"moisture\", and \"feels weird\", milky white discharge.       HISTORY OF PRESENT ILLNESS: Patient is a pleasant 29 y.o. female who presents to urgent care today with concerns of a sore throat and vaginal discharge.  Patient states her sore throat started yesterday, has noticed some white spotting to her throat as well along with congestion.  Denies any fever.  Denies any known ill contacts.  She has not tried a medication for symptom relief.  Regarding vaginal discharge, states she was diagnosed with BV 1 month ago, was treated accordingly, reports return of symptoms.  She is sexually active with 1 male partner, he is without symptoms.    Patient Active Problem List    Diagnosis Date Noted    Postpartum care and examination of lactating mother 2014    Status post device closure of ASD 2013    H/O heart surgery \"ASD\" Has implant card in media 2012       Allergies:Norco [hydrocodone-acetaminophen]    /  No current outpatient medications on file prior to visit.     No current facility-administered medications on file prior to visit.           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 Pt. quit when she found out she was pregnanct.    Vaping Use    Vaping status: Never Used   Substance Use Topics    Alcohol use: Yes     Comment: occ    Drug use: No       Family Status   Relation Name Status    MGFa      Mo  Alive    Fa  Alive        Pt. states she doesnt know her father.    MGMo  Alive    PGMo  Other        Pt. states doesnt know her grandparents.     PGFa  Other   No partnership data on file     Family History   Problem Relation Age of Onset    Diabetes Maternal Grandfather        ROS:  Review of Systems   Constitutional: Negative for fever, chills, weight loss, malaise, and fatigue.   HENT: Positive for sore throat, congestion.  Negative for ear pain, nosebleeds, and neck pain.    Eyes: Negative for vision changes.   Neuro: Negative for headache, sensory changes, weakness, seizure, LOC.   Cardiovascular: Negative for chest pain, palpitations, orthopnea and leg swelling.   Respiratory: Negative for cough, sputum production, shortness of breath and wheezing.   Gastrointestinal: Negative for abdominal pain, nausea, vomiting or diarrhea.   Genitourinary: Negative for dysuria, urgency and frequency.  GYN: Positive for vaginal discharge.  Musculoskeletal: Negative for falls, neck pain, back pain, joint pain, myalgias.   Skin: Negative for rash, diaphoresis.     Exam:  /64   Pulse 96   Temp 36.4 °C (97.6 °F) (Temporal)   Resp 18   Ht 1.702 m (5' 7\")   Wt 80.2 kg (176 lb 12.8 oz)   SpO2 97%   General: well-nourished, well-developed female in NAD  Head: normocephalic, atraumatic  Eyes: PERRLA, no conjunctival injection, acuity grossly intact, lids normal.  Ears: normal shape and symmetry, no tenderness, no discharge. External canals are without any significant edema or erythema. Tympanic membranes are without any inflammation, no effusion. Gross auditory acuity is intact.  Nose: symmetrical without tenderness, no discharge.  Mouth/Throat: reasonable hygiene.  There is erythema in " bilateral tonsillar enlargement noted.  No obvious exudates.  Airways patent.  Neck: no masses, range of motion within normal limits, no tracheal deviation. No obvious thyroid enlargement.   Lymph: no cervical adenopathy. No supraclavicular adenopathy.   Neuro: alert and oriented. Cranial nerves 1-12 grossly intact. No sensory deficit.   Cardiovascular: regular rate and rhythm. No edema.  Pulmonary: no distress. Chest is symmetrical with respiration, no wheezes, crackles, or rhonchi.   Musculoskeletal: no clubbing, appropriate muscle tone, gait is stable.  Skin: warm, dry, intact, no clubbing, no cyanosis, no rashes.   Psych: appropriate mood, affect, judgement.         Lab Results/POC Test Results   Results for orders placed or performed in visit on 11/20/24   POCT GROUP A STREP, PCR    Collection Time: 11/20/24 12:07 PM   Result Value Ref Range    POC Group A Strep, PCR Not Detected Not Detected, Invalid               Assessment/Plan:  1. Viral URI  POCT GROUP A STREP, PCR    dexamethasone (Decadron) injection (check route below) 10 mg      2. Vaginal discharge  VAGINAL PATHOGENS DNA PANEL    Chlamydia/GC, PCR (Genital/Anal swab)            Patient presents with sore throat as well as vaginal discharge.  Strep test negative, suspect viral process.  Decadron provided in clinic.  Regarding discharge, vaginal pathogens and GC sent for testing, will follow-up accordingly.  Supportive care, differential diagnoses, and indications for immediate follow-up discussed with patient.   Pathogenesis of diagnosis discussed including typical length and natural progression.   Instructed to return to clinic or nearest emergency department for any change in condition, further concerns, or worsening of symptoms.  Patient states understanding of the plan of care and discharge instructions.  Instructed to make an appointment, for follow up, with her primary care provider.        Please note that this dictation was created using voice  recognition software. I have made every reasonable attempt to correct obvious errors, but I expect that there are errors of grammar and possibly content that I did not discover before finalizing the note.  Previous clinic visit encounter reviewed and considered in medical decision making today.       JOSAFAT Red.

## 2024-11-21 LAB
C TRACH DNA GENITAL QL NAA+PROBE: POSITIVE
CANDIDA DNA VAG QL PROBE+SIG AMP: NEGATIVE
G VAGINALIS DNA VAG QL PROBE+SIG AMP: POSITIVE
N GONORRHOEA DNA GENITAL QL NAA+PROBE: NEGATIVE
SPECIMEN SOURCE: ABNORMAL
T VAGINALIS DNA VAG QL PROBE+SIG AMP: NEGATIVE

## 2024-11-22 DIAGNOSIS — B96.89 BV (BACTERIAL VAGINOSIS): ICD-10-CM

## 2024-11-22 DIAGNOSIS — A74.9 CHLAMYDIA: ICD-10-CM

## 2024-11-22 DIAGNOSIS — N76.0 BV (BACTERIAL VAGINOSIS): ICD-10-CM

## 2024-11-22 RX ORDER — METRONIDAZOLE 500 MG/1
500 TABLET ORAL 2 TIMES DAILY
Qty: 14 TABLET | Refills: 0 | Status: SHIPPED | OUTPATIENT
Start: 2024-11-22 | End: 2024-11-29

## 2024-11-22 RX ORDER — DOXYCYCLINE HYCLATE 100 MG
100 TABLET ORAL 2 TIMES DAILY
Qty: 14 TABLET | Refills: 0 | Status: SHIPPED | OUTPATIENT
Start: 2024-11-22 | End: 2024-11-29

## 2024-11-23 NOTE — PROGRESS NOTES
Patient calling clinic requesting treatment for positive BV results.  Patient also positive for chlamydia antibiotic sent to pharmacy.

## 2025-01-05 ENCOUNTER — OFFICE VISIT (OUTPATIENT)
Dept: URGENT CARE | Facility: CLINIC | Age: 30
End: 2025-01-05
Payer: MEDICAID

## 2025-01-05 VITALS
OXYGEN SATURATION: 97 % | TEMPERATURE: 99.6 F | HEART RATE: 90 BPM | WEIGHT: 178 LBS | HEIGHT: 67 IN | RESPIRATION RATE: 15 BRPM | BODY MASS INDEX: 27.94 KG/M2 | SYSTOLIC BLOOD PRESSURE: 104 MMHG | DIASTOLIC BLOOD PRESSURE: 76 MMHG

## 2025-01-05 DIAGNOSIS — J00 ACUTE NASOPHARYNGITIS: ICD-10-CM

## 2025-01-05 LAB
FLUAV RNA SPEC QL NAA+PROBE: POSITIVE
FLUBV RNA SPEC QL NAA+PROBE: NEGATIVE
RSV RNA SPEC QL NAA+PROBE: NEGATIVE
S PYO DNA SPEC NAA+PROBE: NOT DETECTED
SARS-COV-2 RNA RESP QL NAA+PROBE: NEGATIVE

## 2025-01-05 PROCEDURE — 87637 SARSCOV2&INF A&B&RSV AMP PRB: CPT | Mod: QW | Performed by: STUDENT IN AN ORGANIZED HEALTH CARE EDUCATION/TRAINING PROGRAM

## 2025-01-05 PROCEDURE — 3078F DIAST BP <80 MM HG: CPT | Performed by: STUDENT IN AN ORGANIZED HEALTH CARE EDUCATION/TRAINING PROGRAM

## 2025-01-05 PROCEDURE — 99213 OFFICE O/P EST LOW 20 MIN: CPT | Performed by: STUDENT IN AN ORGANIZED HEALTH CARE EDUCATION/TRAINING PROGRAM

## 2025-01-05 PROCEDURE — 3074F SYST BP LT 130 MM HG: CPT | Performed by: STUDENT IN AN ORGANIZED HEALTH CARE EDUCATION/TRAINING PROGRAM

## 2025-01-05 PROCEDURE — 87651 STREP A DNA AMP PROBE: CPT | Performed by: STUDENT IN AN ORGANIZED HEALTH CARE EDUCATION/TRAINING PROGRAM

## 2025-01-05 RX ORDER — DEXAMETHASONE SODIUM PHOSPHATE 10 MG/ML
10 INJECTION INTRAMUSCULAR; INTRAVENOUS ONCE
Status: COMPLETED | OUTPATIENT
Start: 2025-01-05 | End: 2025-01-05

## 2025-01-05 RX ADMIN — DEXAMETHASONE SODIUM PHOSPHATE 10 MG: 10 INJECTION INTRAMUSCULAR; INTRAVENOUS at 09:28

## 2025-01-05 ASSESSMENT — ENCOUNTER SYMPTOMS
SORE THROAT: 1
CHILLS: 1
FEVER: 1
COUGH: 1

## 2025-01-05 NOTE — PROGRESS NOTES
"Subjective:   Jose Antonio Wiggins is a 29 y.o. female who presents for Sore Throat (Patient is here today for a sore throat. Patient states that symptoms started on the 01/02/2025. Patient states that she has also been having chills, fevers, headaches. States there are bumps in the back of her throat and it hurts to swallow. )      HPI:  29-year-old female who presents with sore throat for the past 3 days.  She reports she worked as a  on New Year's and started to feel under the weather soon after.  Started as generalized bodyaches and fatigue chills and fevers but on the second developed significant sore throat.  She reported last night she noticed some lumps in the back of her throat and very painful to swallow.  She denies any sick contacts denies any shortness of breath minimal cough minimal congestion.    Review of Systems   Constitutional:  Positive for chills, fever and malaise/fatigue.   HENT:  Positive for congestion and sore throat.    Respiratory:  Positive for cough.        Medications:    This patient does not have an active medication from one of the medication groupers.    Allergies: Norco [hydrocodone-acetaminophen]    Problem List: Jose Antonio Wiggins does not have any pertinent problems on file.    Surgical History:  Past Surgical History:   Procedure Laterality Date    OTHER CARDIAC SURGERY      as a child       Past Social Hx: Jose Antonio Wiggins  reports that she has been smoking cigarettes. She started smoking about 13 years ago. She has a 0.5 pack-year smoking history. She has never used smokeless tobacco. She reports current alcohol use. She reports that she does not use drugs.     Past Family Hx:  Jose Antonio Wiggins family history includes Diabetes in her maternal grandfather.     Problem list, medications, and allergies reviewed by myself today in Epic.     Objective:     /76   Pulse 90   Temp 37.6 °C (99.6 °F) (Temporal)   Resp 15   Ht 1.702 m (5' 7\")   Wt " 80.7 kg (178 lb)   SpO2 97%   BMI 27.88 kg/m²     Physical Exam  Constitutional:       Appearance: Normal appearance.   HENT:      Head: Normocephalic and atraumatic.      Right Ear: Tympanic membrane normal.      Left Ear: Tympanic membrane normal.      Nose: Nose normal. No congestion or rhinorrhea.      Mouth/Throat:      Pharynx: Posterior oropharyngeal erythema present.   Eyes:      Extraocular Movements: Extraocular movements intact.      Conjunctiva/sclera: Conjunctivae normal.   Cardiovascular:      Rate and Rhythm: Normal rate and regular rhythm.      Pulses: Normal pulses.      Heart sounds: Normal heart sounds.   Pulmonary:      Effort: Pulmonary effort is normal.      Breath sounds: Normal breath sounds.   Lymphadenopathy:      Cervical: Cervical adenopathy present.   Neurological:      Mental Status: She is alert.         Assessment/Plan:     Diagnosis and associated orders:     1. Acute nasopharyngitis  POCT CEPHEID COV-2, FLU A/B, RSV - PCR    POCT CEPHEID GROUP A STREP - PCR    dexamethasone (Decadron) injection (check route below) 10 mg         Comments/MDM:   1. Acute nasopharyngitis  - POCT CEPHEID COV-2, FLU A/B, RSV - PCR  - POCT CEPHEID GROUP A STREP - PCR  - dexamethasone (Decadron) injection (check route below) 10 mg    Supportive care to manage pharyngitis discussed with patient  Salt water gargles BID and prn. Suggested 1/4 to 1/2 teaspoon (1.5 to 3.0 g) of salt per one cup (8 ounces or 250 mL) of warm water.   Use of honey and warm water and/or tea helps alleviate feelings of discomfort and pain as well.  OTC throat analgesic spray or lozenge of choice prn throat pain. Dosage and directions per   OTC  analgesic of choice (acetaminophen or NSAID) prn pain. Follow manufactures dosing and safety precautions.     Return precautions discussed including significant worsening of current symptoms no improvement with supportive care or treatment, difficulty breathing or difficulty  swallowing.    Negative viral panel negative strep.  Likely viral upper respiratory illness.       Differential diagnosis, natural history, supportive care, and indications for immediate follow-up discussed.    Advised the patient to follow-up with the primary care physician for recheck, reevaluation, and consideration of further management.    Please note that this dictation was created using voice recognition software. I have made a reasonable attempt to correct obvious errors, but I expect that there are errors of grammar and possibly content that I did not discover before finalizing the note.    Edvin Liu M.D.

## 2025-01-06 ENCOUNTER — TELEPHONE (OUTPATIENT)
Dept: URGENT CARE | Facility: CLINIC | Age: 30
End: 2025-01-06

## 2025-01-06 NOTE — TELEPHONE ENCOUNTER
I called the patient at 8:52 AM on 01/06/25 and left a detailed voice message about recent labs and/or imaging.

## 2025-01-18 ENCOUNTER — APPOINTMENT (OUTPATIENT)
Dept: URGENT CARE | Facility: CLINIC | Age: 30
End: 2025-01-18
Payer: COMMERCIAL

## 2025-01-19 ENCOUNTER — OFFICE VISIT (OUTPATIENT)
Dept: URGENT CARE | Facility: CLINIC | Age: 30
End: 2025-01-19
Payer: COMMERCIAL

## 2025-01-19 ENCOUNTER — APPOINTMENT (OUTPATIENT)
Dept: URGENT CARE | Facility: CLINIC | Age: 30
End: 2025-01-19
Payer: COMMERCIAL

## 2025-01-19 VITALS
TEMPERATURE: 98.5 F | SYSTOLIC BLOOD PRESSURE: 110 MMHG | WEIGHT: 180.3 LBS | BODY MASS INDEX: 28.3 KG/M2 | OXYGEN SATURATION: 97 % | DIASTOLIC BLOOD PRESSURE: 60 MMHG | HEIGHT: 67 IN | HEART RATE: 82 BPM | RESPIRATION RATE: 16 BRPM

## 2025-01-19 DIAGNOSIS — J02.9 ACUTE PHARYNGITIS, UNSPECIFIED ETIOLOGY: ICD-10-CM

## 2025-01-19 LAB — S PYO DNA SPEC NAA+PROBE: NOT DETECTED

## 2025-01-19 PROCEDURE — 87651 STREP A DNA AMP PROBE: CPT | Performed by: STUDENT IN AN ORGANIZED HEALTH CARE EDUCATION/TRAINING PROGRAM

## 2025-01-19 PROCEDURE — 99213 OFFICE O/P EST LOW 20 MIN: CPT | Performed by: STUDENT IN AN ORGANIZED HEALTH CARE EDUCATION/TRAINING PROGRAM

## 2025-01-19 PROCEDURE — 3078F DIAST BP <80 MM HG: CPT | Performed by: STUDENT IN AN ORGANIZED HEALTH CARE EDUCATION/TRAINING PROGRAM

## 2025-01-19 PROCEDURE — 3074F SYST BP LT 130 MM HG: CPT | Performed by: STUDENT IN AN ORGANIZED HEALTH CARE EDUCATION/TRAINING PROGRAM

## 2025-01-19 ASSESSMENT — ENCOUNTER SYMPTOMS
SORE THROAT: 1
CONSTITUTIONAL NEGATIVE: 1
COUGH: 1

## 2025-01-19 NOTE — PROGRESS NOTES
"Subjective:   Jose Antonio Wiggins is a 29 y.o. female who presents for Pharyngitis (Started symptoms yesterday has white patch in the back of throat and coughing )      HPI:  29-year-old female presents with 2-day history of sore throat on the right side of her tonsils as well as noticing a white patch overlying the tonsil.  She reports pain with swallowing and coughing feeling very sharp stabbing pain on that side.  Feels like knives in her throat.  She denies any difficulty breathing denies any significant sinus congestion denies any fevers or chills.  Was recently ill with the flu approximately 10 days ago but that seemed to also resolved.    Review of Systems   Constitutional: Negative.    HENT:  Positive for sore throat.    Respiratory:  Positive for cough.        Medications:    This patient does not have an active medication from one of the medication groupers.    Allergies: Norco [hydrocodone-acetaminophen]    Problem List: Jose Antonio Wiggins does not have any pertinent problems on file.    Surgical History:  Past Surgical History:   Procedure Laterality Date    OTHER CARDIAC SURGERY      as a child       Past Social Hx: Jose Antonio Wiggins  reports that she has been smoking cigarettes. She started smoking about 13 years ago. She has a 0.5 pack-year smoking history. She has never used smokeless tobacco. She reports current alcohol use. She reports that she does not use drugs.     Past Family Hx:  Jose Antonio Wiggins family history includes Diabetes in her maternal grandfather.     Problem list, medications, and allergies reviewed by myself today in Epic.     Objective:     /60 (BP Location: Right arm, Patient Position: Sitting)   Pulse 82   Temp 36.9 °C (98.5 °F) (Temporal)   Resp 16   Ht 1.702 m (5' 7\")   Wt 81.8 kg (180 lb 4.8 oz)   SpO2 97%   BMI 28.24 kg/m²     Physical Exam  Constitutional:       Appearance: Normal appearance.   HENT:      Head: Normocephalic and atraumatic.    "   Right Ear: Tympanic membrane normal.      Left Ear: Tympanic membrane normal.      Nose: Nose normal. No congestion or rhinorrhea.      Mouth/Throat:      Mouth: Mucous membranes are moist.      Pharynx: Oropharyngeal exudate and posterior oropharyngeal erythema present.      Comments: Right tonsillar bed +2 with white patch overlying no significant additional exudate erythema present throughout right side.  Eyes:      Extraocular Movements: Extraocular movements intact.      Conjunctiva/sclera: Conjunctivae normal.   Cardiovascular:      Rate and Rhythm: Normal rate and regular rhythm.      Pulses: Normal pulses.      Heart sounds: Normal heart sounds.   Pulmonary:      Effort: Pulmonary effort is normal.      Breath sounds: Normal breath sounds.   Neurological:      Mental Status: She is alert.         Assessment/Plan:     Diagnosis and associated orders:     1. Acute pharyngitis, unspecified etiology  POCT CEPHEID GROUP A STREP - PCR         Comments/MDM:   1. Acute pharyngitis, unspecified etiology  - POCT CEPHEID GROUP A STREP - PCR    Supportive care to manage pharyngitis discussed with patient  Salt water gargles BID and prn. Suggested 1/4 to 1/2 teaspoon (1.5 to 3.0 g) of salt per one cup (8 ounces or 250 mL) of warm water.   Use of honey and warm water and/or tea helps alleviate feelings of discomfort and pain as well.  OTC throat analgesic spray or lozenge of choice prn throat pain. Dosage and directions per   OTC  analgesic of choice (acetaminophen or NSAID) prn pain. Follow manufactures dosing and safety precautions.     Return precautions discussed including significant worsening of current symptoms no improvement with supportive care or treatment, difficulty breathing or difficulty swallowing.           Differential diagnosis, natural history, supportive care, and indications for immediate follow-up discussed.    Advised the patient to follow-up with the primary care physician for  recheck, reevaluation, and consideration of further management.    Please note that this dictation was created using voice recognition software. I have made a reasonable attempt to correct obvious errors, but I expect that there are errors of grammar and possibly content that I did not discover before finalizing the note.    Edvin Liu M.D.

## 2025-01-29 ENCOUNTER — APPOINTMENT (OUTPATIENT)
Dept: URGENT CARE | Facility: CLINIC | Age: 30
End: 2025-01-29
Payer: COMMERCIAL

## 2025-06-12 ENCOUNTER — OFFICE VISIT (OUTPATIENT)
Dept: URGENT CARE | Facility: CLINIC | Age: 30
End: 2025-06-12
Payer: MEDICAID

## 2025-06-12 ENCOUNTER — HOSPITAL ENCOUNTER (OUTPATIENT)
Facility: MEDICAL CENTER | Age: 30
End: 2025-06-12
Attending: PHYSICIAN ASSISTANT
Payer: MEDICAID

## 2025-06-12 VITALS
BODY MASS INDEX: 28.25 KG/M2 | TEMPERATURE: 97.6 F | RESPIRATION RATE: 16 BRPM | HEIGHT: 67 IN | DIASTOLIC BLOOD PRESSURE: 76 MMHG | SYSTOLIC BLOOD PRESSURE: 112 MMHG | WEIGHT: 180 LBS | OXYGEN SATURATION: 98 % | HEART RATE: 85 BPM

## 2025-06-12 DIAGNOSIS — N76.0 ACUTE VAGINITIS: ICD-10-CM

## 2025-06-12 DIAGNOSIS — Z71.1 CONCERN ABOUT SEXUALLY TRANSMITTED DISEASE IN FEMALE WITHOUT DIAGNOSIS: Primary | ICD-10-CM

## 2025-06-12 DIAGNOSIS — Z71.1 CONCERN ABOUT SEXUALLY TRANSMITTED DISEASE IN FEMALE WITHOUT DIAGNOSIS: ICD-10-CM

## 2025-06-12 LAB
APPEARANCE UR: CLEAR
BILIRUB UR STRIP-MCNC: NEGATIVE MG/DL
COLOR UR AUTO: YELLOW
GLUCOSE UR STRIP.AUTO-MCNC: NEGATIVE MG/DL
KETONES UR STRIP.AUTO-MCNC: NEGATIVE MG/DL
LEUKOCYTE ESTERASE UR QL STRIP.AUTO: NEGATIVE
NITRITE UR QL STRIP.AUTO: NEGATIVE
PH UR STRIP.AUTO: 6 [PH] (ref 5–8)
POCT INT CON NEG: NEGATIVE
POCT INT CON POS: POSITIVE
POCT URINE PREGNANCY TEST: NEGATIVE
PROT UR QL STRIP: NEGATIVE MG/DL
RBC UR QL AUTO: NEGATIVE
SP GR UR STRIP.AUTO: 1.02
UROBILINOGEN UR STRIP-MCNC: NORMAL MG/DL

## 2025-06-12 PROCEDURE — 87591 N.GONORRHOEAE DNA AMP PROB: CPT

## 2025-06-12 PROCEDURE — 81025 URINE PREGNANCY TEST: CPT | Performed by: PHYSICIAN ASSISTANT

## 2025-06-12 PROCEDURE — 87660 TRICHOMONAS VAGIN DIR PROBE: CPT

## 2025-06-12 PROCEDURE — 3078F DIAST BP <80 MM HG: CPT | Performed by: PHYSICIAN ASSISTANT

## 2025-06-12 PROCEDURE — 99213 OFFICE O/P EST LOW 20 MIN: CPT | Performed by: PHYSICIAN ASSISTANT

## 2025-06-12 PROCEDURE — 87510 GARDNER VAG DNA DIR PROBE: CPT

## 2025-06-12 PROCEDURE — 87480 CANDIDA DNA DIR PROBE: CPT

## 2025-06-12 PROCEDURE — 87491 CHLMYD TRACH DNA AMP PROBE: CPT

## 2025-06-12 PROCEDURE — 3074F SYST BP LT 130 MM HG: CPT | Performed by: PHYSICIAN ASSISTANT

## 2025-06-12 PROCEDURE — 81002 URINALYSIS NONAUTO W/O SCOPE: CPT | Performed by: PHYSICIAN ASSISTANT

## 2025-06-12 RX ORDER — FLUCONAZOLE 150 MG/1
TABLET ORAL
Qty: 2 TABLET | Refills: 0 | Status: SHIPPED | OUTPATIENT
Start: 2025-06-12

## 2025-06-12 ASSESSMENT — ENCOUNTER SYMPTOMS
ABDOMINAL PAIN: 0
NAUSEA: 0
FEVER: 0
VOMITING: 0

## 2025-06-12 NOTE — PROGRESS NOTES
"Subjective:   Jose Antonio Wiggins  is a 30 y.o. female who presents for Exposure to STD (Pt requesting std testing / irritation )      Exposure to STD  This is a new problem. The current episode started 1 to 4 weeks ago. Pertinent negatives include no abdominal pain, fever, nausea, rash or vomiting.   Patient presents urgent care describing mild vaginal irritation post coitus.  She has a new partner and wants to ensure she is negative for all STIs or causes of vaginitis.  She notes minimal vaginal irritation and no abnormal vaginal discharge.  She denies dysuria polyuria or hematuria.  Denies fevers chills nausea vomiting or abdominal pain or flank pain.  She denies new rash.  She is 3 weeks status post normal menstrual period.  She denies treatments tried.    Review of Systems   Constitutional:  Negative for fever.   Gastrointestinal:  Negative for abdominal pain, nausea and vomiting.   Genitourinary:  Negative for dysuria, frequency and hematuria.   Skin:  Negative for rash.       Allergies[1]     Objective:   /76   Pulse 85   Temp 36.4 °C (97.6 °F) (Temporal)   Resp 16   Ht 1.702 m (5' 7\")   Wt 81.6 kg (180 lb)   LMP 05/23/2025 (Approximate)   SpO2 98%   BMI 28.19 kg/m²     Physical Exam  Vitals and nursing note reviewed.   Constitutional:       General: She is not in acute distress.     Appearance: She is well-developed. She is not diaphoretic.   HENT:      Head: Normocephalic and atraumatic.      Right Ear: External ear normal.      Left Ear: External ear normal.      Nose: Nose normal.   Eyes:      General: Lids are normal. No scleral icterus.        Right eye: No discharge.         Left eye: No discharge.      Conjunctiva/sclera: Conjunctivae normal.   Pulmonary:      Effort: Pulmonary effort is normal. No respiratory distress.   Abdominal:      Tenderness: There is no right CVA tenderness or left CVA tenderness.   Musculoskeletal:         General: Normal range of motion.      Cervical back: " Neck supple.   Skin:     General: Skin is warm and dry.      Coloration: Skin is not pale.      Findings: No erythema.   Neurological:      Mental Status: She is alert and oriented to person, place, and time. She is not disoriented.   Psychiatric:         Speech: Speech normal.         Behavior: Behavior normal.     Point-of-care urinalysis is negative/normal (see chart)  Point-of-care hCG is negative    Vaginal pathogens testing is pending  Gonorrhea chlamydia testing is pending    Patient is offered testing for HIV and syphilis but she declinesf    Assessment/Plan:   1. Concern about sexually transmitted disease in female without diagnosis  - POCT Urinalysis  - POCT Pregnancy  - Chlamydia/GC, PCR (Genital/Anal swab); Future    2. Acute vaginitis  - POCT Urinalysis  - POCT Pregnancy  - VAGINAL PATHOGENS DNA PANEL; Future  - fluconazole (DIFLUCAN) 150 MG tablet; Take one tablet orally for yeast infection, if symptoms persist, may repeat treatment after 72 hours.  Dispense: 2 Tablet; Refill: 0    Empirically treated now with Diflucan.  [facility]  Return to clinic with lack of resolution or progression of symptoms.      I have worn an N95 mask, gloves and eye protection for the entire encounter with this patient.     Differential diagnosis, natural history, supportive care, and indications for immediate follow-up discussed.            [1]   Allergies  Allergen Reactions    Norco [Hydrocodone-Acetaminophen] Rash     rash

## 2025-06-14 ENCOUNTER — RESULTS FOLLOW-UP (OUTPATIENT)
Dept: URGENT CARE | Facility: CLINIC | Age: 30
End: 2025-06-14

## 2025-06-14 DIAGNOSIS — N76.0 BV (BACTERIAL VAGINOSIS): Primary | ICD-10-CM

## 2025-06-14 DIAGNOSIS — B96.89 BV (BACTERIAL VAGINOSIS): Primary | ICD-10-CM

## 2025-06-14 NOTE — PROGRESS NOTES
1. BV (bacterial vaginosis) (Primary)    - clindamycin (CLEOCIN) 100 MG vaginal suppository; Insert 1 Suppository into the vagina every evening.  Dispense: 3 Suppository; Refill: 0

## 2025-08-03 ENCOUNTER — HOSPITAL ENCOUNTER (OUTPATIENT)
Facility: MEDICAL CENTER | Age: 30
End: 2025-08-03
Payer: MEDICAID

## 2025-08-03 ENCOUNTER — OFFICE VISIT (OUTPATIENT)
Dept: URGENT CARE | Facility: CLINIC | Age: 30
End: 2025-08-03
Payer: MEDICAID

## 2025-08-03 VITALS
HEART RATE: 77 BPM | HEIGHT: 67 IN | DIASTOLIC BLOOD PRESSURE: 82 MMHG | TEMPERATURE: 98.1 F | WEIGHT: 171 LBS | OXYGEN SATURATION: 100 % | SYSTOLIC BLOOD PRESSURE: 130 MMHG | RESPIRATION RATE: 16 BRPM | BODY MASS INDEX: 26.84 KG/M2

## 2025-08-03 DIAGNOSIS — Z20.2 STD EXPOSURE: Primary | ICD-10-CM

## 2025-08-03 LAB
APPEARANCE UR: CLEAR
BILIRUB UR STRIP-MCNC: NEGATIVE MG/DL
COLOR UR AUTO: YELLOW
GLUCOSE UR STRIP.AUTO-MCNC: NEGATIVE MG/DL
KETONES UR STRIP.AUTO-MCNC: NEGATIVE MG/DL
LEUKOCYTE ESTERASE UR QL STRIP.AUTO: NEGATIVE
NITRITE UR QL STRIP.AUTO: NEGATIVE
PH UR STRIP.AUTO: 7 [PH] (ref 5–8)
POCT INT CON NEG: NEGATIVE
POCT INT CON POS: POSITIVE
POCT URINE PREGNANCY TEST: NEGATIVE
PROT UR QL STRIP: NEGATIVE MG/DL
RBC UR QL AUTO: NEGATIVE
SP GR UR STRIP.AUTO: 1.02
UROBILINOGEN UR STRIP-MCNC: 0.2 MG/DL

## 2025-08-03 PROCEDURE — 87591 N.GONORRHOEAE DNA AMP PROB: CPT

## 2025-08-03 PROCEDURE — 87660 TRICHOMONAS VAGIN DIR PROBE: CPT

## 2025-08-03 PROCEDURE — 87491 CHLMYD TRACH DNA AMP PROBE: CPT

## 2025-08-03 PROCEDURE — 87798 DETECT AGENT NOS DNA AMP: CPT | Mod: 91

## 2025-08-03 PROCEDURE — 3079F DIAST BP 80-89 MM HG: CPT

## 2025-08-03 PROCEDURE — 81002 URINALYSIS NONAUTO W/O SCOPE: CPT

## 2025-08-03 PROCEDURE — 3075F SYST BP GE 130 - 139MM HG: CPT

## 2025-08-03 PROCEDURE — 87480 CANDIDA DNA DIR PROBE: CPT

## 2025-08-03 PROCEDURE — 81025 URINE PREGNANCY TEST: CPT

## 2025-08-03 PROCEDURE — 99214 OFFICE O/P EST MOD 30 MIN: CPT

## 2025-08-03 PROCEDURE — 87563 M. GENITALIUM AMP PROBE: CPT

## 2025-08-03 PROCEDURE — 87510 GARDNER VAG DNA DIR PROBE: CPT

## 2025-08-03 ASSESSMENT — ENCOUNTER SYMPTOMS
ABDOMINAL PAIN: 0
FEVER: 0
BACK PAIN: 0
VOMITING: 0
NAUSEA: 0

## 2025-08-04 DIAGNOSIS — Z20.2 STD EXPOSURE: ICD-10-CM

## 2025-08-04 DIAGNOSIS — N76.0 BV (BACTERIAL VAGINOSIS): Primary | ICD-10-CM

## 2025-08-04 DIAGNOSIS — B96.89 BV (BACTERIAL VAGINOSIS): Primary | ICD-10-CM

## 2025-08-04 LAB
AMBIGUOUS DTTM AMBI4: NORMAL
C TRACH DNA GENITAL QL NAA+PROBE: NEGATIVE
CANDIDA DNA VAG QL PROBE+SIG AMP: NEGATIVE
G VAGINALIS DNA VAG QL PROBE+SIG AMP: POSITIVE
N GONORRHOEA DNA GENITAL QL NAA+PROBE: NEGATIVE
SPECIMEN SOURCE: NORMAL
T VAGINALIS DNA VAG QL PROBE+SIG AMP: NEGATIVE

## 2025-08-04 RX ORDER — METRONIDAZOLE 7.5 MG/G
1 GEL VAGINAL
Qty: 7 EACH | Refills: 0 | Status: SHIPPED | OUTPATIENT
Start: 2025-08-04 | End: 2025-08-11

## 2025-08-07 LAB
M GENITALIUM DNA SPEC QL NAA+PROBE: NOT DETECTED
M HOMINIS DNA SPEC QL NAA+PROBE: DETECTED
SPECIMEN SOURCE: ABNORMAL
U PARVUM DNA SPEC QL NAA+PROBE: DETECTED
U UREALYTICUM DNA SPEC QL NAA+PROBE: NOT DETECTED

## 2025-08-08 DIAGNOSIS — A49.3 MYCOPLASMA INFECTION: ICD-10-CM

## 2025-08-08 DIAGNOSIS — A49.3 MYCOPLASMA INFECTION: Primary | ICD-10-CM

## 2025-08-08 RX ORDER — MOXIFLOXACIN HYDROCHLORIDE 400 MG/1
400 TABLET ORAL DAILY
Qty: 5 TABLET | Refills: 0 | Status: SHIPPED | OUTPATIENT
Start: 2025-08-08

## 2025-08-08 RX ORDER — DOXYCYCLINE HYCLATE 100 MG
100 TABLET ORAL 2 TIMES DAILY
Qty: 14 TABLET | Refills: 0 | Status: SHIPPED | OUTPATIENT
Start: 2025-08-08 | End: 2025-08-15

## 2025-08-11 ENCOUNTER — TELEPHONE (OUTPATIENT)
Dept: URGENT CARE | Facility: CLINIC | Age: 30
End: 2025-08-11
Payer: MEDICAID

## 2025-08-13 RX ORDER — LEVOFLOXACIN 500 MG/1
TABLET, FILM COATED ORAL
Qty: 1 TABLET | Refills: 0 | OUTPATIENT
Start: 2025-08-13